# Patient Record
Sex: MALE | Race: BLACK OR AFRICAN AMERICAN | NOT HISPANIC OR LATINO | Employment: FULL TIME | URBAN - METROPOLITAN AREA
[De-identification: names, ages, dates, MRNs, and addresses within clinical notes are randomized per-mention and may not be internally consistent; named-entity substitution may affect disease eponyms.]

---

## 2023-08-23 ENCOUNTER — HOSPITAL ENCOUNTER (EMERGENCY)
Facility: HOSPITAL | Age: 38
Discharge: HOME | End: 2023-08-23
Attending: EMERGENCY MEDICINE
Payer: COMMERCIAL

## 2023-08-23 VITALS
HEART RATE: 90 BPM | RESPIRATION RATE: 18 BRPM | WEIGHT: 218.25 LBS | BODY MASS INDEX: 28.93 KG/M2 | HEIGHT: 73 IN | DIASTOLIC BLOOD PRESSURE: 101 MMHG | SYSTOLIC BLOOD PRESSURE: 140 MMHG | TEMPERATURE: 99 F | OXYGEN SATURATION: 99 %

## 2023-08-23 DIAGNOSIS — E86.0 DEHYDRATION: Primary | ICD-10-CM

## 2023-08-23 DIAGNOSIS — Z20.822 CLOSE EXPOSURE TO COVID-19 VIRUS: ICD-10-CM

## 2023-08-23 DIAGNOSIS — T67.9XXA HEAT EXPOSURE, INITIAL ENCOUNTER: ICD-10-CM

## 2023-08-23 DIAGNOSIS — R74.8 ELEVATED CPK: ICD-10-CM

## 2023-08-23 DIAGNOSIS — R25.2 MUSCLE CRAMP: ICD-10-CM

## 2023-08-23 LAB
ALBUMIN SERPL BCP-MCNC: 4.8 G/DL (ref 3.5–5.2)
ALP SERPL-CCNC: 56 U/L (ref 55–135)
ALT SERPL W/O P-5'-P-CCNC: 34 U/L (ref 10–44)
ANION GAP SERPL CALC-SCNC: 12 MMOL/L (ref 8–16)
AST SERPL-CCNC: 32 U/L (ref 10–40)
BASOPHILS # BLD AUTO: 0.03 K/UL (ref 0–0.2)
BASOPHILS NFR BLD: 0.6 % (ref 0–1.9)
BILIRUB SERPL-MCNC: 0.9 MG/DL (ref 0.1–1)
BUN SERPL-MCNC: 17 MG/DL (ref 6–20)
CALCIUM SERPL-MCNC: 9.7 MG/DL (ref 8.7–10.5)
CHLORIDE SERPL-SCNC: 102 MMOL/L (ref 95–110)
CK SERPL-CCNC: 1074 U/L (ref 20–200)
CO2 SERPL-SCNC: 25 MMOL/L (ref 23–29)
CREAT SERPL-MCNC: 1.4 MG/DL (ref 0.5–1.4)
DIFFERENTIAL METHOD: ABNORMAL
EOSINOPHIL # BLD AUTO: 0.4 K/UL (ref 0–0.5)
EOSINOPHIL NFR BLD: 8.5 % (ref 0–8)
ERYTHROCYTE [DISTWIDTH] IN BLOOD BY AUTOMATED COUNT: 12.5 % (ref 11.5–14.5)
EST. GFR  (NO RACE VARIABLE): >60 ML/MIN/1.73 M^2
GLUCOSE SERPL-MCNC: 77 MG/DL (ref 70–110)
HCT VFR BLD AUTO: 44.6 % (ref 40–54)
HGB BLD-MCNC: 15.5 G/DL (ref 14–18)
IMM GRANULOCYTES # BLD AUTO: 0.01 K/UL (ref 0–0.04)
IMM GRANULOCYTES NFR BLD AUTO: 0.2 % (ref 0–0.5)
INFLUENZA A, MOLECULAR: NEGATIVE
INFLUENZA B, MOLECULAR: NEGATIVE
LYMPHOCYTES # BLD AUTO: 1.7 K/UL (ref 1–4.8)
LYMPHOCYTES NFR BLD: 32.9 % (ref 18–48)
MCH RBC QN AUTO: 28.5 PG (ref 27–31)
MCHC RBC AUTO-ENTMCNC: 34.8 G/DL (ref 32–36)
MCV RBC AUTO: 82 FL (ref 82–98)
MONOCYTES # BLD AUTO: 0.5 K/UL (ref 0.3–1)
MONOCYTES NFR BLD: 9.1 % (ref 4–15)
NEUTROPHILS # BLD AUTO: 2.5 K/UL (ref 1.8–7.7)
NEUTROPHILS NFR BLD: 48.7 % (ref 38–73)
NRBC BLD-RTO: 0 /100 WBC
PLATELET # BLD AUTO: 273 K/UL (ref 150–450)
PMV BLD AUTO: 10.3 FL (ref 9.2–12.9)
POTASSIUM SERPL-SCNC: 4.1 MMOL/L (ref 3.5–5.1)
PROT SERPL-MCNC: 8.1 G/DL (ref 6–8.4)
RBC # BLD AUTO: 5.44 M/UL (ref 4.6–6.2)
SARS-COV-2 RDRP RESP QL NAA+PROBE: NEGATIVE
SODIUM SERPL-SCNC: 139 MMOL/L (ref 136–145)
SPECIMEN SOURCE: NORMAL
WBC # BLD AUTO: 5.07 K/UL (ref 3.9–12.7)

## 2023-08-23 PROCEDURE — 96360 HYDRATION IV INFUSION INIT: CPT

## 2023-08-23 PROCEDURE — 99284 EMERGENCY DEPT VISIT MOD MDM: CPT | Mod: 25

## 2023-08-23 PROCEDURE — 96361 HYDRATE IV INFUSION ADD-ON: CPT

## 2023-08-23 PROCEDURE — U0002 COVID-19 LAB TEST NON-CDC: HCPCS | Performed by: REGISTERED NURSE

## 2023-08-23 PROCEDURE — 87502 INFLUENZA DNA AMP PROBE: CPT | Performed by: REGISTERED NURSE

## 2023-08-23 PROCEDURE — 85025 COMPLETE CBC W/AUTO DIFF WBC: CPT | Performed by: REGISTERED NURSE

## 2023-08-23 PROCEDURE — 25000003 PHARM REV CODE 250: Performed by: REGISTERED NURSE

## 2023-08-23 PROCEDURE — 80053 COMPREHEN METABOLIC PANEL: CPT | Performed by: REGISTERED NURSE

## 2023-08-23 PROCEDURE — 82550 ASSAY OF CK (CPK): CPT | Performed by: REGISTERED NURSE

## 2023-08-23 RX ADMIN — SODIUM CHLORIDE 1000 ML: 9 INJECTION, SOLUTION INTRAVENOUS at 01:08

## 2023-08-23 RX ADMIN — SODIUM CHLORIDE 1000 ML: 9 INJECTION, SOLUTION INTRAVENOUS at 12:08

## 2023-08-23 NOTE — ED PROVIDER NOTES
Encounter Date: 8/23/2023       History     Chief Complaint   Patient presents with    COVID-19 Concerns     Exposed to COVID c/o body aches, non-productive cough, also c/o cramping after working in heat.      38-year-old male presents emergency department with complaints of muscle cramps and cough.  Patient reports positive exposure to COVID.  Patient reports extensive work in the heat.  Patient states that his arms lock up occasionally.  Patient denies any chest pain, shortness of breath, fever, chills or any other symptoms.    The history is provided by the patient.     Review of patient's allergies indicates:  No Known Allergies  Past Medical History:   Diagnosis Date    Hypertension     Murmur, cardiac      No past surgical history on file.  Family History   Problem Relation Age of Onset    Hypertension Mother     Hypertension Father      Social History     Tobacco Use    Smoking status: Former     Current packs/day: 0.00    Smokeless tobacco: Never   Substance Use Topics    Alcohol use: No    Drug use: No     Review of Systems   Constitutional:  Negative for fever.   HENT:  Negative for sore throat.    Respiratory:  Positive for cough. Negative for shortness of breath.    Cardiovascular:  Negative for chest pain.   Gastrointestinal:  Negative for nausea.   Genitourinary:  Negative for dysuria.   Musculoskeletal:  Positive for myalgias. Negative for back pain.        +muscle cramps   Skin:  Negative for rash.   Neurological:  Negative for weakness.   Hematological:  Does not bruise/bleed easily.   All other systems reviewed and are negative.      Physical Exam     Initial Vitals [08/23/23 1041]   BP Pulse Resp Temp SpO2   (!) 170/109 97 18 98.4 °F (36.9 °C) 99 %      MAP       --         Physical Exam    Constitutional: He appears well-developed and well-nourished. No distress.   HENT:   Head: Normocephalic and atraumatic.   Nose: Nose normal.   Mouth/Throat: Uvula is midline and oropharynx is clear  and moist.   Eyes: Conjunctivae and EOM are normal. Pupils are equal, round, and reactive to light.   Neck: Neck supple.   Normal range of motion.  Cardiovascular:  Normal rate and regular rhythm.           Pulmonary/Chest: Effort normal and breath sounds normal. No respiratory distress. He has no decreased breath sounds. He has no wheezes. He has no rales.   Abdominal: Abdomen is soft. Bowel sounds are normal. There is no abdominal tenderness.   Musculoskeletal:         General: Normal range of motion.      Cervical back: Normal range of motion and neck supple.     Neurological: He is alert and oriented to person, place, and time. He has normal strength. GCS eye subscore is 4. GCS verbal subscore is 5. GCS motor subscore is 6.   Skin: Skin is warm and dry. Capillary refill takes less than 2 seconds. No rash noted.   Psychiatric: He has a normal mood and affect. His speech is normal and behavior is normal.       ED Course   Procedures  Labs Reviewed   CBC W/ AUTO DIFFERENTIAL - Abnormal; Notable for the following components:       Result Value    Eosinophil % 8.5 (*)     All other components within normal limits   CK - Abnormal; Notable for the following components:    CPK 1074 (*)     All other components within normal limits   INFLUENZA A & B BY MOLECULAR   SARS-COV-2 RNA AMPLIFICATION, QUAL   COMPREHENSIVE METABOLIC PANEL          Imaging Results    None          Medications   sodium chloride 0.9% bolus 1,000 mL 1,000 mL (1,000 mLs Intravenous New Bag 8/23/23 1309)   sodium chloride 0.9% bolus 1,000 mL 1,000 mL (1,000 mLs Intravenous New Bag 8/23/23 1216)     Medical Decision Making  Amount and/or Complexity of Data Reviewed  Labs: ordered.                               Clinical Impression:   Final diagnoses:  [Z20.822] Close exposure to COVID-19 virus  [R74.8] Elevated CPK  [R25.2] Muscle cramp  [T67.9XXA] Heat exposure, initial encounter  [E86.0] Dehydration (Primary)        ED Disposition Condition     Discharge Stable          ED Prescriptions    None       Follow-up Information       Follow up With Specialties Details Why Contact Info    [facility] - Emergency Dept. Emergency Medicine  If symptoms worsen [id number] [address] [phone]             Link Montiel Jr., [facility]  08/23/23 1871

## 2023-08-23 NOTE — Clinical Note
"Jose Wilkes"Wolf was seen and treated in our emergency department on 8/23/2023.  He may return to work on 08/25/2023.       If you have any questions or concerns, please don't hesitate to call.      Link Montiel Jr., FNP"

## 2025-05-14 ENCOUNTER — HOSPITAL ENCOUNTER (INPATIENT)
Facility: HOSPITAL | Age: 40
LOS: 2 days | Discharge: HOME | DRG: 281 | End: 2025-05-16
Attending: EMERGENCY MEDICINE | Admitting: INTERNAL MEDICINE
Payer: COMMERCIAL

## 2025-05-14 DIAGNOSIS — R07.9 CHEST PAIN: ICD-10-CM

## 2025-05-14 DIAGNOSIS — F17.210 TOBACCO SMOKER, LESS THAN 10 CIGARETTES PER DAY: ICD-10-CM

## 2025-05-14 DIAGNOSIS — I21.3 STEMI (ST ELEVATION MYOCARDIAL INFARCTION): ICD-10-CM

## 2025-05-14 DIAGNOSIS — R94.31 ABNORMAL EKG: ICD-10-CM

## 2025-05-14 DIAGNOSIS — I16.1 HYPERTENSIVE EMERGENCY: ICD-10-CM

## 2025-05-14 DIAGNOSIS — I21.3 ST ELEVATION MYOCARDIAL INFARCTION (STEMI), UNSPECIFIED ARTERY: Primary | ICD-10-CM

## 2025-05-14 PROBLEM — J02.9 SORE THROAT: Status: ACTIVE | Noted: 2025-05-14

## 2025-05-14 LAB
ABORH RETYPE: NORMAL
ABSOLUTE EOSINOPHIL (OHS): 0.16 K/UL
ABSOLUTE MONOCYTE (OHS): 0.92 K/UL (ref 0.3–1)
ABSOLUTE NEUTROPHIL COUNT (OHS): 4.69 K/UL (ref 1.8–7.7)
ALBUMIN SERPL BCP-MCNC: 4.7 G/DL (ref 3.5–5.2)
ALLENS TEST: ABNORMAL
ALP SERPL-CCNC: 57 UNIT/L (ref 40–150)
ALT SERPL W/O P-5'-P-CCNC: 33 UNIT/L (ref 10–44)
ANION GAP (OHS): 12 MMOL/L (ref 8–16)
APTT PPP: 28.5 SECONDS (ref 21–32)
AST SERPL-CCNC: 21 UNIT/L (ref 11–45)
BASOPHILS # BLD AUTO: 0.03 K/UL
BASOPHILS NFR BLD AUTO: 0.4 %
BILIRUB SERPL-MCNC: 1.1 MG/DL (ref 0.1–1)
BNP SERPL-MCNC: <10 PG/ML (ref 0–99)
BUN SERPL-MCNC: 16 MG/DL (ref 6–20)
CALCIUM SERPL-MCNC: 9.6 MG/DL (ref 8.7–10.5)
CATH EF ESTIMATED: 60 %
CATH EF QUANTITATIVE: 65 %
CHLORIDE SERPL-SCNC: 102 MMOL/L (ref 95–110)
CO2 SERPL-SCNC: 20 MMOL/L (ref 23–29)
CREAT SERPL-MCNC: 1.3 MG/DL (ref 0.5–1.4)
DELSYS: ABNORMAL
ERYTHROCYTE [DISTWIDTH] IN BLOOD BY AUTOMATED COUNT: 13 % (ref 11.5–14.5)
GFR SERPLBLD CREATININE-BSD FMLA CKD-EPI: >60 ML/MIN/1.73/M2
GLUCOSE SERPL-MCNC: 92 MG/DL (ref 70–110)
GROUP A STREP MOLECULAR (OHS): NEGATIVE
HCO3 UR-SCNC: 25.2 MMOL/L (ref 24–28)
HCT VFR BLD AUTO: 43.4 % (ref 40–54)
HGB BLD-MCNC: 15 GM/DL (ref 14–18)
IMM GRANULOCYTES # BLD AUTO: 0.02 K/UL (ref 0–0.04)
IMM GRANULOCYTES NFR BLD AUTO: 0.3 % (ref 0–0.5)
INDIRECT COOMBS: NORMAL
INR PPP: 0.9 (ref 0.8–1.2)
LYMPHOCYTES # BLD AUTO: 1.41 K/UL (ref 1–4.8)
MCH RBC QN AUTO: 28 PG (ref 27–31)
MCHC RBC AUTO-ENTMCNC: 34.6 G/DL (ref 32–36)
MCV RBC AUTO: 81 FL (ref 82–98)
NUCLEATED RBC (/100WBC) (OHS): 0 /100 WBC
PCO2 BLDA: 39.6 MMHG (ref 35–45)
PH SMN: 7.41 [PH] (ref 7.35–7.45)
PLATELET # BLD AUTO: 258 K/UL (ref 150–450)
PMV BLD AUTO: 10.3 FL (ref 9.2–12.9)
PO2 BLDA: 26 MMHG (ref 40–60)
POC BE: 1 MMOL/L (ref -2–2)
POC SATURATED O2: 47 % (ref 95–100)
POTASSIUM SERPL-SCNC: 3.7 MMOL/L (ref 3.5–5.1)
PROT SERPL-MCNC: 8.3 GM/DL (ref 6–8.4)
PROTHROMBIN TIME: 10.8 SECONDS (ref 9–12.5)
RBC # BLD AUTO: 5.35 M/UL (ref 4.6–6.2)
RELATIVE EOSINOPHIL (OHS): 2.2 %
RELATIVE LYMPHOCYTE (OHS): 19.5 % (ref 18–48)
RELATIVE MONOCYTE (OHS): 12.7 % (ref 4–15)
RELATIVE NEUTROPHIL (OHS): 64.9 % (ref 38–73)
RH BLD: NORMAL
SAMPLE: ABNORMAL
SITE: ABNORMAL
SODIUM SERPL-SCNC: 134 MMOL/L (ref 136–145)
SPECIMEN OUTDATE: NORMAL
TROPONIN I SERPL DL<=0.01 NG/ML-MCNC: <0.006 NG/ML
WBC # BLD AUTO: 7.23 K/UL (ref 3.9–12.7)

## 2025-05-14 PROCEDURE — 85025 COMPLETE CBC W/AUTO DIFF WBC: CPT | Performed by: EMERGENCY MEDICINE

## 2025-05-14 PROCEDURE — 96374 THER/PROPH/DIAG INJ IV PUSH: CPT

## 2025-05-14 PROCEDURE — 20000000 HC ICU ROOM

## 2025-05-14 PROCEDURE — 25000003 PHARM REV CODE 250: Performed by: NURSE PRACTITIONER

## 2025-05-14 PROCEDURE — 63600175 PHARM REV CODE 636 W HCPCS: Performed by: EMERGENCY MEDICINE

## 2025-05-14 PROCEDURE — 63600175 PHARM REV CODE 636 W HCPCS: Performed by: INTERNAL MEDICINE

## 2025-05-14 PROCEDURE — C1769 GUIDE WIRE: HCPCS | Performed by: INTERNAL MEDICINE

## 2025-05-14 PROCEDURE — 84484 ASSAY OF TROPONIN QUANT: CPT | Performed by: EMERGENCY MEDICINE

## 2025-05-14 PROCEDURE — 99900035 HC TECH TIME PER 15 MIN (STAT)

## 2025-05-14 PROCEDURE — C1760 CLOSURE DEV, VASC: HCPCS | Performed by: INTERNAL MEDICINE

## 2025-05-14 PROCEDURE — 80053 COMPREHEN METABOLIC PANEL: CPT | Performed by: EMERGENCY MEDICINE

## 2025-05-14 PROCEDURE — C1894 INTRO/SHEATH, NON-LASER: HCPCS | Performed by: INTERNAL MEDICINE

## 2025-05-14 PROCEDURE — 99223 1ST HOSP IP/OBS HIGH 75: CPT | Mod: 25,,, | Performed by: INTERNAL MEDICINE

## 2025-05-14 PROCEDURE — 93458 L HRT ARTERY/VENTRICLE ANGIO: CPT | Performed by: INTERNAL MEDICINE

## 2025-05-14 PROCEDURE — 85730 THROMBOPLASTIN TIME PARTIAL: CPT | Performed by: EMERGENCY MEDICINE

## 2025-05-14 PROCEDURE — 86850 RBC ANTIBODY SCREEN: CPT | Performed by: EMERGENCY MEDICINE

## 2025-05-14 PROCEDURE — 83880 ASSAY OF NATRIURETIC PEPTIDE: CPT | Performed by: EMERGENCY MEDICINE

## 2025-05-14 PROCEDURE — 25500020 PHARM REV CODE 255: Performed by: INTERNAL MEDICINE

## 2025-05-14 PROCEDURE — B2111ZZ FLUOROSCOPY OF MULTIPLE CORONARY ARTERIES USING LOW OSMOLAR CONTRAST: ICD-10-PCS | Performed by: INTERNAL MEDICINE

## 2025-05-14 PROCEDURE — 87651 STREP A DNA AMP PROBE: CPT | Performed by: NURSE PRACTITIONER

## 2025-05-14 PROCEDURE — 27201423 OPTIME MED/SURG SUP & DEVICES STERILE SUPPLY: Performed by: INTERNAL MEDICINE

## 2025-05-14 PROCEDURE — 25000242 PHARM REV CODE 250 ALT 637 W/ HCPCS: Performed by: NURSE PRACTITIONER

## 2025-05-14 PROCEDURE — 4A023N7 MEASUREMENT OF CARDIAC SAMPLING AND PRESSURE, LEFT HEART, PERCUTANEOUS APPROACH: ICD-10-PCS | Performed by: INTERNAL MEDICINE

## 2025-05-14 PROCEDURE — 99285 EMERGENCY DEPT VISIT HI MDM: CPT | Mod: 25

## 2025-05-14 PROCEDURE — B2151ZZ FLUOROSCOPY OF LEFT HEART USING LOW OSMOLAR CONTRAST: ICD-10-PCS | Performed by: INTERNAL MEDICINE

## 2025-05-14 PROCEDURE — 80307 DRUG TEST PRSMV CHEM ANLYZR: CPT | Performed by: INTERNAL MEDICINE

## 2025-05-14 PROCEDURE — 85610 PROTHROMBIN TIME: CPT | Performed by: EMERGENCY MEDICINE

## 2025-05-14 PROCEDURE — 82803 BLOOD GASES ANY COMBINATION: CPT

## 2025-05-14 PROCEDURE — 93010 ELECTROCARDIOGRAM REPORT: CPT | Mod: ,,, | Performed by: INTERNAL MEDICINE

## 2025-05-14 PROCEDURE — 93458 L HRT ARTERY/VENTRICLE ANGIO: CPT | Mod: 26,,, | Performed by: INTERNAL MEDICINE

## 2025-05-14 PROCEDURE — 96375 TX/PRO/DX INJ NEW DRUG ADDON: CPT

## 2025-05-14 PROCEDURE — 25000003 PHARM REV CODE 250: Performed by: INTERNAL MEDICINE

## 2025-05-14 PROCEDURE — 99152 MOD SED SAME PHYS/QHP 5/>YRS: CPT | Mod: ,,, | Performed by: INTERNAL MEDICINE

## 2025-05-14 PROCEDURE — 25000003 PHARM REV CODE 250: Performed by: EMERGENCY MEDICINE

## 2025-05-14 PROCEDURE — 93005 ELECTROCARDIOGRAM TRACING: CPT

## 2025-05-14 DEVICE — KIT ANGIO SEAL 6FR VIP: Type: IMPLANTABLE DEVICE | Site: GROIN | Status: FUNCTIONAL

## 2025-05-14 RX ORDER — FENTANYL CITRATE 50 UG/ML
INJECTION, SOLUTION INTRAMUSCULAR; INTRAVENOUS
Status: DISCONTINUED | OUTPATIENT
Start: 2025-05-14 | End: 2025-05-14 | Stop reason: HOSPADM

## 2025-05-14 RX ORDER — SODIUM CHLORIDE 9 MG/ML
INJECTION, SOLUTION INTRAVENOUS CONTINUOUS
Status: ACTIVE | OUTPATIENT
Start: 2025-05-14 | End: 2025-05-15

## 2025-05-14 RX ORDER — DIPHENHYDRAMINE HYDROCHLORIDE 50 MG/ML
INJECTION, SOLUTION INTRAMUSCULAR; INTRAVENOUS
Status: DISCONTINUED | OUTPATIENT
Start: 2025-05-14 | End: 2025-05-14 | Stop reason: HOSPADM

## 2025-05-14 RX ORDER — LABETALOL HYDROCHLORIDE 5 MG/ML
INJECTION, SOLUTION INTRAVENOUS
Status: DISCONTINUED | OUTPATIENT
Start: 2025-05-14 | End: 2025-05-14 | Stop reason: HOSPADM

## 2025-05-14 RX ORDER — HYDRALAZINE HYDROCHLORIDE 20 MG/ML
20 INJECTION INTRAMUSCULAR; INTRAVENOUS
Status: COMPLETED | OUTPATIENT
Start: 2025-05-14 | End: 2025-05-14

## 2025-05-14 RX ORDER — CLOPIDOGREL BISULFATE 300 MG/1
600 TABLET, FILM COATED ORAL
Status: COMPLETED | OUTPATIENT
Start: 2025-05-14 | End: 2025-05-14

## 2025-05-14 RX ORDER — ASPIRIN 325 MG
325 TABLET ORAL
Status: DISCONTINUED | OUTPATIENT
Start: 2025-05-14 | End: 2025-05-14

## 2025-05-14 RX ORDER — NICARDIPINE HYDROCHLORIDE 0.2 MG/ML
0-15 INJECTION INTRAVENOUS CONTINUOUS
Status: DISCONTINUED | OUTPATIENT
Start: 2025-05-14 | End: 2025-05-15

## 2025-05-14 RX ORDER — CARVEDILOL 12.5 MG/1
12.5 TABLET ORAL 2 TIMES DAILY
Status: DISCONTINUED | OUTPATIENT
Start: 2025-05-14 | End: 2025-05-15

## 2025-05-14 RX ORDER — HYDRALAZINE HYDROCHLORIDE 20 MG/ML
INJECTION INTRAMUSCULAR; INTRAVENOUS
Status: DISCONTINUED | OUTPATIENT
Start: 2025-05-14 | End: 2025-05-14 | Stop reason: HOSPADM

## 2025-05-14 RX ORDER — PANTOPRAZOLE SODIUM 40 MG/1
40 TABLET, DELAYED RELEASE ORAL DAILY
Status: DISCONTINUED | OUTPATIENT
Start: 2025-05-15 | End: 2025-05-16 | Stop reason: HOSPADM

## 2025-05-14 RX ORDER — FLUTICASONE PROPIONATE 50 MCG
2 SPRAY, SUSPENSION (ML) NASAL DAILY
Status: DISCONTINUED | OUTPATIENT
Start: 2025-05-14 | End: 2025-05-16 | Stop reason: HOSPADM

## 2025-05-14 RX ORDER — ENOXAPARIN SODIUM 100 MG/ML
40 INJECTION SUBCUTANEOUS EVERY 24 HOURS
Status: DISCONTINUED | OUTPATIENT
Start: 2025-05-15 | End: 2025-05-16 | Stop reason: HOSPADM

## 2025-05-14 RX ORDER — HEPARIN SODIUM 5000 [USP'U]/ML
80 INJECTION, SOLUTION INTRAVENOUS; SUBCUTANEOUS
Status: DISCONTINUED | OUTPATIENT
Start: 2025-05-14 | End: 2025-05-14

## 2025-05-14 RX ORDER — ONDANSETRON 8 MG/1
8 TABLET, ORALLY DISINTEGRATING ORAL EVERY 8 HOURS PRN
Status: DISCONTINUED | OUTPATIENT
Start: 2025-05-14 | End: 2025-05-14

## 2025-05-14 RX ORDER — LIDOCAINE HYDROCHLORIDE 20 MG/ML
INJECTION, SOLUTION EPIDURAL; INFILTRATION; INTRACAUDAL; PERINEURAL
Status: DISCONTINUED | OUTPATIENT
Start: 2025-05-14 | End: 2025-05-14 | Stop reason: HOSPADM

## 2025-05-14 RX ORDER — HEPARIN SODIUM 5000 [USP'U]/ML
4000 INJECTION, SOLUTION INTRAVENOUS; SUBCUTANEOUS
Status: DISCONTINUED | OUTPATIENT
Start: 2025-05-14 | End: 2025-05-14

## 2025-05-14 RX ORDER — MIDAZOLAM HYDROCHLORIDE 1 MG/ML
INJECTION, SOLUTION INTRAMUSCULAR; INTRAVENOUS
Status: DISCONTINUED | OUTPATIENT
Start: 2025-05-14 | End: 2025-05-14 | Stop reason: HOSPADM

## 2025-05-14 RX ORDER — ONDANSETRON HYDROCHLORIDE 2 MG/ML
8 INJECTION, SOLUTION INTRAVENOUS EVERY 6 HOURS PRN
Status: DISCONTINUED | OUTPATIENT
Start: 2025-05-14 | End: 2025-05-16 | Stop reason: HOSPADM

## 2025-05-14 RX ORDER — SODIUM CHLORIDE 9 MG/ML
100 INJECTION, SOLUTION INTRAVENOUS CONTINUOUS
Status: DISCONTINUED | OUTPATIENT
Start: 2025-05-14 | End: 2025-05-14

## 2025-05-14 RX ORDER — ACETAMINOPHEN 325 MG/1
650 TABLET ORAL EVERY 4 HOURS PRN
Status: DISCONTINUED | OUTPATIENT
Start: 2025-05-14 | End: 2025-05-16 | Stop reason: HOSPADM

## 2025-05-14 RX ORDER — MUPIROCIN 20 MG/G
OINTMENT TOPICAL 2 TIMES DAILY
Status: DISCONTINUED | OUTPATIENT
Start: 2025-05-14 | End: 2025-05-16 | Stop reason: HOSPADM

## 2025-05-14 RX ORDER — HEPARIN SODIUM 5000 [USP'U]/ML
INJECTION, SOLUTION INTRAVENOUS; SUBCUTANEOUS CODE/TRAUMA/SEDATION MEDICATION
Status: DISCONTINUED | OUTPATIENT
Start: 2025-05-14 | End: 2025-05-16

## 2025-05-14 RX ADMIN — CARVEDILOL 12.5 MG: 12.5 TABLET, FILM COATED ORAL at 11:05

## 2025-05-14 RX ADMIN — CLOPIDOGREL BISULFATE 600 MG: 300 TABLET, FILM COATED ORAL at 08:05

## 2025-05-14 RX ADMIN — HEPARIN SODIUM 4000 UNITS: 5000 INJECTION, SOLUTION INTRAVENOUS; SUBCUTANEOUS at 08:05

## 2025-05-14 RX ADMIN — FLUTICASONE PROPIONATE 100 MCG: 50 SPRAY, METERED NASAL at 11:05

## 2025-05-14 RX ADMIN — MUPIROCIN: 20 OINTMENT TOPICAL at 11:05

## 2025-05-14 RX ADMIN — SODIUM CHLORIDE: 9 INJECTION, SOLUTION INTRAVENOUS at 10:05

## 2025-05-14 RX ADMIN — NICARDIPINE HYDROCHLORIDE 5 MG/HR: 0.2 INJECTION, SOLUTION INTRAVENOUS at 10:05

## 2025-05-14 RX ADMIN — HYDRALAZINE HYDROCHLORIDE 20 MG: 20 INJECTION INTRAMUSCULAR; INTRAVENOUS at 08:05

## 2025-05-14 NOTE — LETTER
May 16, 2025    Jose Bloom  [address] [address] [facility] [id number]  [place] [address]  Phone: [phone]  Fax: [phone]   May 16, 2025     Patient: Jose Bloom   YOB: 1985   Date of Visit: 5/14/2025       To Whom it May Concern:    Jose Bloom was admitted on 5/14/2025. He may return to work on May 23, 2025.    Please excuse him from any work missed.    If you have any questions or concerns, please don't hesitate to call.    Sincerely,         Dania Hoffman RN

## 2025-05-14 NOTE — Clinical Note
Diagnosis: STEMI (ST elevation myocardial infarction) [900078]   Future Attending Provider: VALORIE MCNEILL [[id number]]

## 2025-05-15 PROBLEM — I21.3 STEMI (ST ELEVATION MYOCARDIAL INFARCTION): Status: RESOLVED | Noted: 2025-05-14 | Resolved: 2025-05-15

## 2025-05-15 PROBLEM — R94.31 ABNORMAL EKG: Status: ACTIVE | Noted: 2025-05-15

## 2025-05-15 LAB
ABSOLUTE EOSINOPHIL (OHS): 0.08 K/UL
ABSOLUTE MONOCYTE (OHS): 0.69 K/UL (ref 0.3–1)
ABSOLUTE NEUTROPHIL COUNT (OHS): 6.47 K/UL (ref 1.8–7.7)
AMPHET UR QL SCN: NEGATIVE
ANION GAP (OHS): 12 MMOL/L (ref 8–16)
AORTIC ROOT ANNULUS: 3.4 CM
AORTIC SIZE INDEX: 1.3 CM/M2
ASCENDING AORTA: 3 CM
AV INDEX (PROSTH): 0.6
AV MEAN GRADIENT: 7 MMHG
AV PEAK GRADIENT: 13 MMHG
AV VALVE AREA BY VELOCITY RATIO: 2.7 CM²
AV VALVE AREA: 2.9 CM²
AV VELOCITY RATIO: 0.56
BARBITURATE SCN PRESENT UR: NEGATIVE
BASOPHILS # BLD AUTO: 0.02 K/UL
BASOPHILS NFR BLD AUTO: 0.3 %
BENZODIAZ UR QL SCN: ABNORMAL
BSA FOR ECHO PROCEDURE: 2.36 M2
BUN SERPL-MCNC: 13 MG/DL (ref 6–20)
CALCIUM SERPL-MCNC: 9.1 MG/DL (ref 8.7–10.5)
CANNABINOIDS UR QL SCN: ABNORMAL
CHLORIDE SERPL-SCNC: 106 MMOL/L (ref 95–110)
CHOLEST SERPL-MCNC: 174 MG/DL (ref 120–199)
CHOLEST/HDLC SERPL: 4.7 {RATIO} (ref 2–5)
CO2 SERPL-SCNC: 19 MMOL/L (ref 23–29)
COCAINE UR QL SCN: NEGATIVE
CREAT SERPL-MCNC: 0.9 MG/DL (ref 0.5–1.4)
CREAT UR-MCNC: 35 MG/DL (ref 23–375)
CV ECHO LV RWT: 0.67 CM
DOP CALC AO PEAK VEL: 1.8 M/S
DOP CALC AO VTI: 31.2 CM
DOP CALC LVOT AREA: 4.9 CM2
DOP CALC LVOT DIAMETER: 2.5 CM
DOP CALC LVOT PEAK VEL: 1 M/S
DOP CALC LVOT STROKE VOLUME: 91.7 CM3
DOP CALC RVOT PEAK VEL: 0.84 M/S
DOP CALC RVOT VTI: 15.2 CM
DOP CALCLVOT PEAK VEL VTI: 18.7 CM
E WAVE DECELERATION TIME: 335 MSEC
E/A RATIO: 1.08
E/E' RATIO: 7 M/S
ECHO LV POSTERIOR WALL: 1.5 CM (ref 0.6–1.1)
ERYTHROCYTE [DISTWIDTH] IN BLOOD BY AUTOMATED COUNT: 12.9 % (ref 11.5–14.5)
FRACTIONAL SHORTENING: 40 % (ref 28–44)
GFR SERPLBLD CREATININE-BSD FMLA CKD-EPI: >60 ML/MIN/1.73/M2
GLUCOSE SERPL-MCNC: 114 MG/DL (ref 70–110)
HCT VFR BLD AUTO: 40.9 % (ref 40–54)
HDLC SERPL-MCNC: 37 MG/DL (ref 40–75)
HDLC SERPL: 21.3 % (ref 20–50)
HGB BLD-MCNC: 14.4 GM/DL (ref 14–18)
HOLD SPECIMEN: NORMAL
IMM GRANULOCYTES # BLD AUTO: 0.03 K/UL (ref 0–0.04)
IMM GRANULOCYTES NFR BLD AUTO: 0.4 % (ref 0–0.5)
INTERVENTRICULAR SEPTUM: 1.3 CM (ref 0.6–1.1)
IVC DIAMETER: 1.1 CM
IVRT: 67 MSEC
LA MAJOR: 5.3 CM
LA MINOR: 5.7 CM
LA WIDTH: 3.7 CM
LDLC SERPL CALC-MCNC: 110.8 MG/DL (ref 63–159)
LEFT ATRIUM AREA SYSTOLIC (APICAL 2 CHAMBER): 17.29 CM2
LEFT ATRIUM AREA SYSTOLIC (APICAL 4 CHAMBER): 15.29 CM2
LEFT ATRIUM SIZE: 3.4 CM
LEFT ATRIUM VOLUME INDEX MOD: 17 ML/M2
LEFT ATRIUM VOLUME INDEX: 25 ML/M2
LEFT ATRIUM VOLUME MOD: 40 ML
LEFT ATRIUM VOLUME: 59 CM3
LEFT INTERNAL DIMENSION IN SYSTOLE: 2.7 CM (ref 2.1–4)
LEFT VENTRICLE DIASTOLIC VOLUME INDEX: 38.79 ML/M2
LEFT VENTRICLE DIASTOLIC VOLUME: 90 ML
LEFT VENTRICLE END SYSTOLIC VOLUME APICAL 2 CHAMBER: 45.23 ML
LEFT VENTRICLE END SYSTOLIC VOLUME APICAL 4 CHAMBER: 34.97 ML
LEFT VENTRICLE MASS INDEX: 107.1 G/M2
LEFT VENTRICLE SYSTOLIC VOLUME INDEX: 12.1 ML/M2
LEFT VENTRICLE SYSTOLIC VOLUME: 28 ML
LEFT VENTRICULAR INTERNAL DIMENSION IN DIASTOLE: 4.5 CM (ref 3.5–6)
LEFT VENTRICULAR MASS: 248.4 G
LV LATERAL E/E' RATIO: 6.5 M/S
LV SEPTAL E/E' RATIO: 6.5 M/S
LVED V (TEICH): 90.35 ML
LVES V (TEICH): 27.69 ML
LVOT MG: 2.48 MMHG
LVOT MV: 0.74 CM/S
LYMPHOCYTES # BLD AUTO: 0.68 K/UL (ref 1–4.8)
MAGNESIUM SERPL-MCNC: 1.9 MG/DL (ref 1.6–2.6)
MCH RBC QN AUTO: 28.6 PG (ref 27–31)
MCHC RBC AUTO-ENTMCNC: 35.2 G/DL (ref 32–36)
MCV RBC AUTO: 81 FL (ref 82–98)
METHADONE UR QL SCN: NEGATIVE
MV PEAK A VEL: 0.6 M/S
MV PEAK E VEL: 0.65 M/S
MV STENOSIS PRESSURE HALF TIME: 97.23 MS
MV VALVE AREA P 1/2 METHOD: 2.26 CM2
NONHDLC SERPL-MCNC: 137 MG/DL
NUCLEATED RBC (/100WBC) (OHS): 0 /100 WBC
OPIATES UR QL SCN: NEGATIVE
PCP UR QL: NEGATIVE
PHOSPHATE SERPL-MCNC: 3.2 MG/DL (ref 2.7–4.5)
PLATELET # BLD AUTO: 232 K/UL (ref 150–450)
PMV BLD AUTO: 10.2 FL (ref 9.2–12.9)
POTASSIUM SERPL-SCNC: 3.5 MMOL/L (ref 3.5–5.1)
PV MEAN GRADIENT: 2 MMHG
RA MAJOR: 4.43 CM
RA PRESSURE ESTIMATED: 3 MMHG
RA WIDTH: 2.7 CM
RBC # BLD AUTO: 5.04 M/UL (ref 4.6–6.2)
RELATIVE EOSINOPHIL (OHS): 1 %
RELATIVE LYMPHOCYTE (OHS): 8.5 % (ref 18–48)
RELATIVE MONOCYTE (OHS): 8.7 % (ref 4–15)
RELATIVE NEUTROPHIL (OHS): 81.1 % (ref 38–73)
SODIUM SERPL-SCNC: 137 MMOL/L (ref 136–145)
STJ: 3.1 CM
TDI LATERAL: 0.1 M/S
TDI SEPTAL: 0.1 M/S
TDI: 0.1 M/S
TRICUSPID ANNULAR PLANE SYSTOLIC EXCURSION: 2.3 CM
TRIGL SERPL-MCNC: 131 MG/DL (ref 30–150)
TROPONIN I SERPL DL<=0.01 NG/ML-MCNC: 0.02 NG/ML
WBC # BLD AUTO: 7.97 K/UL (ref 3.9–12.7)
Z-SCORE OF LEFT VENTRICULAR DIMENSION IN END DIASTOLE: -7.21
Z-SCORE OF LEFT VENTRICULAR DIMENSION IN END SYSTOLE: -5.74

## 2025-05-15 PROCEDURE — 63600175 PHARM REV CODE 636 W HCPCS: Performed by: INTERNAL MEDICINE

## 2025-05-15 PROCEDURE — 25000003 PHARM REV CODE 250: Performed by: NURSE PRACTITIONER

## 2025-05-15 PROCEDURE — 36415 COLL VENOUS BLD VENIPUNCTURE: CPT | Performed by: NURSE PRACTITIONER

## 2025-05-15 PROCEDURE — 21400001 HC TELEMETRY ROOM

## 2025-05-15 PROCEDURE — 99233 SBSQ HOSP IP/OBS HIGH 50: CPT | Mod: 25,,, | Performed by: INTERNAL MEDICINE

## 2025-05-15 PROCEDURE — 80061 LIPID PANEL: CPT | Performed by: NURSE PRACTITIONER

## 2025-05-15 PROCEDURE — 63600175 PHARM REV CODE 636 W HCPCS: Performed by: NURSE PRACTITIONER

## 2025-05-15 PROCEDURE — 80048 BASIC METABOLIC PNL TOTAL CA: CPT | Performed by: NURSE PRACTITIONER

## 2025-05-15 PROCEDURE — 83735 ASSAY OF MAGNESIUM: CPT | Performed by: NURSE PRACTITIONER

## 2025-05-15 PROCEDURE — 84100 ASSAY OF PHOSPHORUS: CPT | Performed by: NURSE PRACTITIONER

## 2025-05-15 PROCEDURE — 85025 COMPLETE CBC W/AUTO DIFF WBC: CPT | Performed by: NURSE PRACTITIONER

## 2025-05-15 PROCEDURE — 25000003 PHARM REV CODE 250: Performed by: INTERNAL MEDICINE

## 2025-05-15 PROCEDURE — 84484 ASSAY OF TROPONIN QUANT: CPT | Performed by: NURSE PRACTITIONER

## 2025-05-15 RX ORDER — ASPIRIN 81 MG/1
81 TABLET ORAL DAILY
Status: DISCONTINUED | OUTPATIENT
Start: 2025-05-15 | End: 2025-05-16 | Stop reason: HOSPADM

## 2025-05-15 RX ORDER — FUROSEMIDE 10 MG/ML
40 INJECTION INTRAMUSCULAR; INTRAVENOUS ONCE
Status: COMPLETED | OUTPATIENT
Start: 2025-05-15 | End: 2025-05-15

## 2025-05-15 RX ORDER — CARVEDILOL 12.5 MG/1
25 TABLET ORAL 2 TIMES DAILY
Status: DISCONTINUED | OUTPATIENT
Start: 2025-05-15 | End: 2025-05-16 | Stop reason: HOSPADM

## 2025-05-15 RX ORDER — TALC
6 POWDER (GRAM) TOPICAL NIGHTLY PRN
Status: DISCONTINUED | OUTPATIENT
Start: 2025-05-15 | End: 2025-05-16 | Stop reason: HOSPADM

## 2025-05-15 RX ORDER — ATORVASTATIN CALCIUM 10 MG/1
20 TABLET, FILM COATED ORAL NIGHTLY
Status: DISCONTINUED | OUTPATIENT
Start: 2025-05-15 | End: 2025-05-16 | Stop reason: HOSPADM

## 2025-05-15 RX ORDER — LOSARTAN POTASSIUM 50 MG/1
50 TABLET ORAL DAILY
Status: DISCONTINUED | OUTPATIENT
Start: 2025-05-15 | End: 2025-05-16

## 2025-05-15 RX ADMIN — ACETAMINOPHEN 650 MG: 325 TABLET ORAL at 05:05

## 2025-05-15 RX ADMIN — CARVEDILOL 12.5 MG: 12.5 TABLET, FILM COATED ORAL at 08:05

## 2025-05-15 RX ADMIN — CARVEDILOL 25 MG: 12.5 TABLET, FILM COATED ORAL at 10:05

## 2025-05-15 RX ADMIN — ENOXAPARIN SODIUM 40 MG: 40 INJECTION SUBCUTANEOUS at 05:05

## 2025-05-15 RX ADMIN — MUPIROCIN: 20 OINTMENT TOPICAL at 10:05

## 2025-05-15 RX ADMIN — CARVEDILOL 25 MG: 12.5 TABLET, FILM COATED ORAL at 09:05

## 2025-05-15 RX ADMIN — FUROSEMIDE 40 MG: 10 INJECTION, SOLUTION INTRAMUSCULAR; INTRAVENOUS at 09:05

## 2025-05-15 RX ADMIN — FLUTICASONE PROPIONATE 100 MCG: 50 SPRAY, METERED NASAL at 08:05

## 2025-05-15 RX ADMIN — NICARDIPINE HYDROCHLORIDE 2.5 MG/HR: 0.2 INJECTION, SOLUTION INTRAVENOUS at 08:05

## 2025-05-15 RX ADMIN — NICARDIPINE HYDROCHLORIDE 10 MG/HR: 0.2 INJECTION, SOLUTION INTRAVENOUS at 01:05

## 2025-05-15 RX ADMIN — ATORVASTATIN CALCIUM 20 MG: 10 TABLET, FILM COATED ORAL at 09:05

## 2025-05-15 RX ADMIN — MUPIROCIN: 20 OINTMENT TOPICAL at 08:05

## 2025-05-15 RX ADMIN — ONDANSETRON 8 MG: 2 INJECTION INTRAMUSCULAR; INTRAVENOUS at 12:05

## 2025-05-15 RX ADMIN — PANTOPRAZOLE SODIUM 40 MG: 40 TABLET, DELAYED RELEASE ORAL at 08:05

## 2025-05-15 RX ADMIN — ACETAMINOPHEN 650 MG: 325 TABLET ORAL at 08:05

## 2025-05-15 RX ADMIN — Medication 6 MG: at 10:05

## 2025-05-15 RX ADMIN — ATORVASTATIN CALCIUM 20 MG: 10 TABLET, FILM COATED ORAL at 10:05

## 2025-05-15 RX ADMIN — LOSARTAN POTASSIUM 50 MG: 50 TABLET, FILM COATED ORAL at 09:05

## 2025-05-15 RX ADMIN — ASPIRIN 81 MG: 81 TABLET, COATED ORAL at 09:05

## 2025-05-15 NOTE — EICU
Intervention Initiated From:  [facility] / EICU    Buddy intervened regarding:  Rounding (Video assessment)    Comments: Virtual ICU Quality Rounds    Admit Date: 5/14/2025  Hospital Day: 1    ICU Day: 13h    24H Vital Sign Range:  Temp:  [98.1 °F (36.7 °C)-98.5 °F (36.9 °C)]   Pulse:  []   Resp:  [13-37]   BP: (121-179)/()   SpO2:  [92 %-99 %]     VICU Surveillance Screening    LDA reconciliation : Yes

## 2025-05-15 NOTE — HOSPITAL COURSE
5/15/2025-Patient seen and examined today, s/p [facility] which showed non-obs CAD. Feels ok. Reported some mild CP/SOB overnight, improved. Off Cardene drip. Meds adjusted. Labs reviewed.

## 2025-05-15 NOTE — SUBJECTIVE & OBJECTIVE
Past Medical History:   Diagnosis Date    Hypertension     Murmur, cardiac        No past surgical history on file.    Review of patient's allergies indicates:  No Known Allergies    Family History       Problem Relation (Age of Onset)    Hypertension Mother, Father          Tobacco Use    Smoking status: Some Days     Current packs/day: 0.10     Types: Cigarettes    Smokeless tobacco: Never   Substance and Sexual Activity    Alcohol use: Yes     Comment: depends on the week, at most a couple drinks after work; this past week that has been daily    Drug use: No    Sexual activity: Not on file         Review of Systems   Constitutional:  Positive for diaphoresis (resolved).   HENT:  Positive for congestion and sore throat.    Respiratory:  Positive for cough.    Cardiovascular:  Positive for chest pain (resolved).   Gastrointestinal:  Negative for abdominal pain, nausea and vomiting.     Objective:     Vital Signs (Most Recent):  Temp: 98.2 °F (36.8 °C) (05/14/25 2040)  Pulse: 81 (05/14/25 2059)  Resp: (!) 21 (05/14/25 2059)  BP: (!) 160/118 (05/14/25 2059)  SpO2: 95 % (05/14/25 2059) Vital Signs (24h Range):  Temp:  [98.2 °F (36.8 °C)] 98.2 °F (36.8 °C)  Pulse:  [71-81] 81  Resp:  [16-21] 21  SpO2:  [95 %-99 %] 95 %  BP: (160-179)/(118) 160/118        Body mass index is 28.8 kg/m².    No intake or output data in the 24 hours ending 05/14/25 2157     Physical Exam  Vitals and nursing note reviewed.   Constitutional:       General: He is awake. He is not in acute distress.  HENT:      Head: Atraumatic.      Nose: Mucosal edema and congestion present.   Eyes:      Conjunctiva/sclera:      Right eye: Right conjunctiva is injected.      Left eye: Left conjunctiva is injected.   Neck:      Vascular: No JVD.   Cardiovascular:      Rate and Rhythm: Normal rate and regular rhythm.      Pulses:           Radial pulses are 2+ on the right side and 2+ on the left side.        Dorsalis pedis pulses are 2+ on the right  side and 2+ on the left side.   Pulmonary:      Effort: Pulmonary effort is normal.      Breath sounds: Normal breath sounds.   Abdominal:      General: Bowel sounds are normal. There is no distension.      Palpations: Abdomen is soft.      Tenderness: There is no abdominal tenderness.   Musculoskeletal:      Right lower leg: No edema.      Left lower leg: No edema.   Skin:     General: Skin is warm and dry.      Capillary Refill: Capillary refill takes less than 2 seconds.          Neurological:      Mental Status: He is alert. Mental status is at baseline.      GCS: GCS eye subscore is 4. GCS verbal subscore is 5. GCS motor subscore is 6.      Cranial Nerves: Cranial nerves 2-12 are intact.   Psychiatric:         Attention and Perception: Attention normal.         Mood and Affect: Mood is anxious. Affect is tearful.         Speech: Speech normal.         Behavior: Behavior normal. Behavior is cooperative.         Thought Content: Thought content normal.        Vents:       Lines/Drains/Airways       Peripheral Intravenous Line  Duration                  Peripheral IV - Single Lumen 05/14/25 2045 18 G Right Antecubital <1 day         Peripheral IV - Single Lumen 05/14/25 2055 20 G 1 1/4 in Left Antecubital <1 day                    Significant Labs:    CBC/Anemia Profile:  Recent Labs   Lab 05/14/25 2049   WBC 7.23   HGB 15.0   HCT 43.4      MCV 81*   RDW 13.0        Chemistries:  Recent Labs   Lab 05/14/25 2049   *   K 3.7      CO2 20*   BUN 16   CREATININE 1.3   CALCIUM 9.6   ALBUMIN 4.7   PROT 8.3   BILITOT 1.1*   ALKPHOS 57   ALT 33   AST 21       Troponin:   Recent Labs   Lab 05/14/25 2049   TROPONINI <0.006     All pertinent labs within the past 24 hours have been reviewed.    Significant Imaging:   I have reviewed all pertinent imaging results/findings within the past 24 hours.

## 2025-05-15 NOTE — ASSESSMENT & PLAN NOTE
Reports one to two cigarettes per day  We discussed specific cardiac risks of nicotine   He verbalizes understanding and goal of immediate cessation

## 2025-05-15 NOTE — EICU
EICU BRIEF ADMIT NOTE:    HISTORY:  STEMI Please refer to H/P and ER notes for detail    CAMERA ASSESSMENT: Two way audiovisual assessment was done: Yes    Telemetry was reviewed. Medical records including notes, labs and imaging were reviewed.Yes    DISCUSSED with bedside nurse.No     ASSESSMENT AND PLAN:    # STEMI: s/p cardiac cath, report pending). Antiplatelet therapy, beta blocker    BEST PRACTICES REVIEW:    INTUBATED: No  GLYCEMIN CONTROL:  Diabetes:  No   STRESS ULCER PROPHYLAXIS:  PPI   DVT PROPHYLAXIS:  Pharmacological    Thank You for allowing EICU to participate in the care of the patient. Please call as needed      Ludin Pratt MD  EICU  Critical Care Medicine

## 2025-05-15 NOTE — PROGRESS NOTES
Pt arrived to ICU via bed from [facility] at this time. NSR on heart monitor. Pt aaox4 in NAD. On RA. Cardene and NS gtt infusing on arrival; see MAR. R groin site soft, dressing CDI. Bed rest ends at 0100. Pt denies CP at this time, but reported intermittent CP prior to arrival.   Full HTT assessment as charted.   Discussed POC w/pt and spouse at bs. Pt has been orientated to bed rest, room, call light, fall precautions, and board. No further questions or concerns at this time.

## 2025-05-15 NOTE — ASSESSMENT & PLAN NOTE
-Initial EKG concerning for STEMI  -s/p LHC which showed non-obs CAD  -Off Cardende gtt  -Continue ASA, statin, BB, ARB  -Transfer to telemetry

## 2025-05-15 NOTE — ASSESSMENT & PLAN NOTE
Patient has a current diagnosis of Hypertensive emergency with end organ damage evidenced by acute coronary syndrome which is uncontrolled.    He has been taking over the counter cold/congestion med (dayquil) which likely contributed to BP elevation. We discussed risks along with safe cough/congestion medication for future.    Latest blood pressure and vitals reviewed-   Temp:  [98.1 °F (36.7 °C)-98.5 °F (36.9 °C)]   Pulse:  []   Resp:  [13-37]   BP: (121-179)/()   SpO2:  [92 %-99 %] .     Home meds for hypertension were reviewed currently taking losartan daily     Patient currently on IV antihypertensives - Cardene infusion, titrate for goal SBP < 140  Medication adjustment for hospital antihypertensives is as follows- Discussed with Cardiology will continue cardene drip, initiate coreg, Plan to adjust further in am pending response to coreg.    Will aim for controlled BP reduction by medications noted above. Monitor and mitigate end organ damage as indicated.    5/15 - cont meds , bp currently controlled

## 2025-05-15 NOTE — ASSESSMENT & PLAN NOTE
Non obstructive CAD on emergent LHC with EF 55%  Believe STEMI is direct result of malignant hypertension; see plan for management above  Continuous ICU cardiac monitoring

## 2025-05-15 NOTE — HPI
40 year old male with known HTN; tobacco and marijuana smoker    Presented to  ~8am 5/14 with chest pain, cough, sore throat, and congestion. /125. EKG showed ST elevation, he was given an ASA 325mg and recommended EMS to ED immediately. Pt declined ambulance and went home.    He presented to ED @ 2043 on 5/14 with 10/10 chest pain and diaphoresis  EKG again with ST elevation MI. /118  Taken emergently for C

## 2025-05-15 NOTE — ASSESSMENT & PLAN NOTE
With nasal congestion  Denies history of environmental allergies, does work outdoors    Suspect viral etiology; will rule out strep throat  Trial flonase for nasal congestion; adjust if not helpful  Avoid decongestants that effect BP

## 2025-05-15 NOTE — PROGRESS NOTES
[facility] - Intensive Care ([facility])  Critical Care Medicine  Progress Note    Patient Name: Jose Bloom  MRN: [id number]  Admission Date: 5/14/2025  Hospital Length of Stay: 1 days  Code Status: Full Code  Attending Provider: Christ Butts MD  Primary Care Provider: Rachele Peter   Principal Problem: STEMI (ST elevation myocardial infarction)    Subjective:     HPI:  40 year old male with known HTN; tobacco and marijuana smoker    Presented to  ~8am 5/14 with chest pain, cough, sore throat, and congestion. /125. EKG showed ST elevation, he was given an ASA 325mg and recommended EMS to ED immediately. Pt declined ambulance and went home.    He presented to ED @ 2043 on 5/14 with 10/10 chest pain and diaphoresis  EKG again with ST elevation MI. /118  Taken emergently for [facility]    Hospital/ICU Course:  LHC revealed non obstructive CAD with no acute lesion; EF 55%  Per Cardiology, ST elevation likely s/t Hypertensive emergency  Admitting to ICU from cath lab on cardene infusion for BP control  Critical Care consulted to assist with medical management. Seen on arrival.    5/15 no acute events   No complains   No chest pain    Interval History: no acute events  No complaints       Objective:     Vital Signs (Most Recent):  Temp: 98.5 °F (36.9 °C) (05/15/25 0715)  Pulse: 80 (05/15/25 1100)  Resp: 20 (05/15/25 1100)  BP: 125/80 (05/15/25 1100)  SpO2: 97 % (05/15/25 1100) Vital Signs (24h Range):  Temp:  [98.1 °F (36.7 °C)-98.5 °F (36.9 °C)] 98.5 °F (36.9 °C)  Pulse:  [] 80  Resp:  [13-37] 20  SpO2:  [92 %-99 %] 97 %  BP: (121-179)/() 125/80     Weight: 108 kg (238 lb)  Body mass index is 31.4 kg/m².      Intake/Output Summary (Last 24 hours) at 5/15/2025 1254  Last data filed at 5/15/2025 1054  Gross per 24 hour   Intake 1555.23 ml   Output 3150 ml   Net -1594.77 ml        Physical Exam  Vitals and nursing note reviewed.   Constitutional:       General: He is not in acute  distress.  HENT:      Head: Normocephalic and atraumatic.   Eyes:      General:         Left eye: No discharge.   Cardiovascular:      Rate and Rhythm: Normal rate and regular rhythm.      Heart sounds: No murmur heard.  Pulmonary:      Effort: No respiratory distress.      Breath sounds: No wheezing.   Abdominal:      General: There is no distension.      Palpations: Abdomen is soft.   Musculoskeletal:         General: No swelling or tenderness.      Cervical back: Neck supple. No rigidity.   Neurological:      General: No focal deficit present.      Mental Status: He is alert and oriented to person, place, and time.           Review of Systems   HENT: Negative.     Respiratory: Negative.     Cardiovascular: Negative.    Gastrointestinal: Negative.    Genitourinary: Negative.    Musculoskeletal: Negative.        Vents:       Lines/Drains/Airways       Peripheral Intravenous Line  Duration                  Peripheral IV - Single Lumen 05/14/25 2045 18 G Right Antecubital <1 day         Peripheral IV - Single Lumen 05/14/25 2055 20 G 1 1/4 in Left Antecubital <1 day                    Significant Labs:    CBC/Anemia Profile:  Recent Labs   Lab 05/14/25  2049 05/15/25  0326   WBC 7.23 7.97   HGB 15.0 14.4   HCT 43.4 40.9    232   MCV 81* 81*   RDW 13.0 12.9        Chemistries:  Recent Labs   Lab 05/14/25  2049 05/15/25  0326   * 137   K 3.7 3.5    106   CO2 20* 19*   BUN 16 13   CREATININE 1.3 0.9   CALCIUM 9.6 9.1   ALBUMIN 4.7  --    PROT 8.3  --    BILITOT 1.1*  --    ALKPHOS 57  --    ALT 33  --    AST 21  --    MG  --  1.9   PHOS  --  3.2       All pertinent labs within the past 24 hours have been reviewed.    Significant Imaging:  I have reviewed all pertinent imaging results/findings within the past 24 hours.    ABG  Recent Labs   Lab 05/14/25 2059   PH 7.411   PO2 26*   PCO2 39.6   HCO3 25.2   BE 1     Assessment/Plan:     ENT  Sore throat  With nasal congestion  Denies history of  environmental allergies, does work outdoors    Suspect viral etiology; will rule out strep throat  Trial flonase for nasal congestion; adjust if not helpful  Avoid decongestants that effect BP    Cardiac/Vascular  * STEMI (ST elevation myocardial infarction)  Non obstructive CAD on emergent C with EF 55%  Believe STEMI is direct result of malignant hypertension; see plan for management above  Continuous ICU cardiac monitoring    5/15 nonobstructive asa/ statin betablocker    Hypertensive emergency  Patient has a current diagnosis of Hypertensive emergency with end organ damage evidenced by acute coronary syndrome which is uncontrolled.    He has been taking over the counter cold/congestion med (dayquil) which likely contributed to BP elevation. We discussed risks along with safe cough/congestion medication for future.    Latest blood pressure and vitals reviewed-   Temp:  [98.1 °F (36.7 °C)-98.5 °F (36.9 °C)]   Pulse:  []   Resp:  [13-37]   BP: (121-179)/()   SpO2:  [92 %-99 %] .     Home meds for hypertension were reviewed currently taking losartan daily     Patient currently on IV antihypertensives - Cardene infusion, titrate for goal SBP < 140  Medication adjustment for hospital antihypertensives is as follows- Discussed with Cardiology will continue cardene drip, initiate coreg, Plan to adjust further in am pending response to coreg.    Will aim for controlled BP reduction by medications noted above. Monitor and mitigate end organ damage as indicated.    5/15 - cont meds , bp currently controlled     Other  Tobacco smoker, less than 10 cigarettes per day  Reports one to two cigarettes per day  We discussed specific cardiac risks of nicotine   He verbalizes understanding and goal of immediate cessation    Will sign off on transfer        Mahsa Curran MD  Critical Care Medicine  [facility] - Int[facility] ([facility])

## 2025-05-15 NOTE — BRIEF OP NOTE
[facility] - Cath Lab (Hospital)  Brief Operative Note  Cardiology    SUMMARY     Surgery Date: 5/14/2025     Surgeons and Role:     * Christ Butts MD - Primary    Assisting Surgeon: None    Pre-op Diagnosis:  STEMI (ST elevation myocardial infarction) [I21.3]    Post-op Diagnosis: Post-Op Diagnosis Codes:     * ST elevation myocardial infarction (STEMI), unspecified artery [I21.3]    Procedure Performed:     Procedure(s) (LRB):  Left heart cath (Left)  Ventriculogram, Left  ANGIOGRAM, CORONARY ARTERY (N/A)    Technical Procedures Used:     Operative Findings:   Results for orders placed during the hospital encounter of 05/14/25    Cardiac catheterization (Needs Review)  This result has not been signed. Information might be incomplete.    Conclusion    The ejection fraction was calculated to be 65%.    The ejection fraction was greater than 55% by visual estimate.    The pre-procedure left ventricular end diastolic pressure was 15.    The post-procedure left ventricular end diastolic pressure was 19.    The estimated blood loss was none.    There was non-obstructive coronary artery disease..    There was diastolic dysfunction.    The filling pressures on the left were mildly elevated.    Agressive HTN control    The procedure log was documented by Documenter: Josie Polanco RN and verified by Christ Butts MD.    Date: 5/14/2025  Time: 9:34 PM    Treat HTN   S/p angioseal RFA     Estimated Blood Loss: * No values recorded between 5/14/2025 12:00 AM and 5/14/2025  9:39 PM *         Specimens:   Specimen (24h ago, onward)      None

## 2025-05-15 NOTE — HOSPITAL COURSE
LHC revealed non obstructive CAD with no acute lesion; EF 55%  Per Cardiology, ST elevation likely s/t Hypertensive emergency  Admitting to ICU from cath lab on cardene infusion for BP control  Critical Care consulted to assist with medical management. Seen on arrival.    5/15 no acute events   No complains   No chest pain

## 2025-05-15 NOTE — PROGRESS NOTES
R groin site soft, no hematoma noted. Dressing CDI. Bed rest completed at this time. HOB raised and pt positioned comfortably w/o issue.

## 2025-05-15 NOTE — PLAN OF CARE
[facility] ([facility])  Initial Discharge Assessment       Primary Care Provider: Rachele Peter    Admission Diagnosis: STEMI (ST elevation myocardial infarction) [I21.3]  Chest pain [R07.9]  ST elevation myocardial infarction (STEMI), unspecified artery [I21.3]    Admission Date: 5/14/2025  Expected Discharge Date:     Transition of Care Barriers: (P) None    Payor: BLUE CROSS BLUE SHIELD / Plan: [place] / Product Type: HMO /     Extended Emergency Contact Information  Primary Emergency Contact: Tiffanie Bloom  Mobile Phone: [phone]  Relation: Spouse  Preferred language: English   needed? No    Discharge Plan A: (P) Home with family         [id number] - [address]  [address] [facility] [address]  Phone: [phone] Fax: [phone]      Initial Assessment (most recent)       Adult Discharge Assessment - 05/15/25 1237          Discharge Assessment    Assessment Type Discharge Planning Assessment (P)      Confirmed/corrected address, phone number and insurance Yes (P)      Confirmed Demographics Correct on Facesheet (P)      Source of Information patient;family (P)      When was your last doctors appointment? 10/07/24 (P)      Communicated LIZA with patient/caregiver Date not available/Unable to determine (P)      Reason For Admission NSTEMI (P)      People in Home spouse;child(conor), dependent (P)      Facility Arrived From: home (P)      Do you expect to return to your current living situation? Yes (P)      Do you have help at home or someone to help you manage your care at home? Yes (P)      Who are your caregiver(s) and their phone number(s)? spouse (P)      Prior to hospitilization cognitive status: Alert/Oriented (P)      Current cognitive status: Alert/Oriented (P)      Walking or Climbing Stairs Difficulty no (P)      Dressing/Bathing Difficulty no (P)      Equipment Currently Used at Home none (P)      Readmission within 30  days? No (P)      Patient currently being followed by outpatient case management? No (P)      Do you currently have service(s) that help you manage your care at home? No (P)      Do you take prescription medications? Yes (P)      Do you have prescription coverage? Yes (P)      Do you have any problems affording any of your prescribed medications? No (P)      Who is going to help you get home at discharge? spouse (P)      How do you get to doctors appointments? car, drives self;family or friend will provide (P)      Are you on dialysis? No (P)      Do you take coumadin? No (P)      Discharge Plan A Home with family (P)      DME Needed Upon Discharge  none (P)      Discharge Plan discussed with: Patient;Spouse/sig other (P)      Transition of Care Barriers None (P)                       Patient lives with spouse and 16 year old son.  Patient is independent with ADL's.  Currently no needs.

## 2025-05-15 NOTE — INTERVAL H&P NOTE
The patient has been examined and the H&P has been reviewed:    I concur with the findings and no changes have occurred since H&P was written.    Anesthesia/Surgery risks, benefits and alternative options discussed and understood by patient/family.          Active Hospital Problems    Diagnosis  POA    *STEMI (ST elevation myocardial infarction) [I21.3]  Yes      Resolved Hospital Problems   No resolved problems to display.

## 2025-05-15 NOTE — CONSULTS
[facility])  Critical Care Medicine  Consult Note    Patient Name: Jose Bloom  MRN: [id number]  Admission Date: 5/14/2025  Hospital Length of Stay: 0 days  Code Status: Full Code  Attending Physician: Christ Butts MD   Primary Care Provider: [place] Provider   Principal Problem: STEMI (ST elevation myocardial infarction)    Inpatient consult to Critical Care Medicine  Consult performed by: Tiffanie Lyon ACN-BC  Consult ordered by: Christ Butts MD        Subjective:     HPI:  40 year old male with known HTN; tobacco and marijuana smoker    Presented to  ~8am 5/14 with chest pain, cough, sore throat, and congestion. /125. EKG showed ST elevation, he was given an ASA 325mg and recommended EMS to ED immediately. Pt declined ambulance and went home.    He presented to ED @ 2043 on 5/14 with 10/10 chest pain and diaphoresis  EKG again with ST elevation MI. /118  Taken emergently for [facility]    Hospital/ICU Course:  LHC revealed non obstructive CAD with no acute lesion; EF 55%  Per Cardiology, ST elevation likely s/t Hypertensive emergency  Admitting to ICU from cath lab on cardene infusion for BP control  Critical Care consulted to assist with medical management. Seen on arrival.    Past Medical History:   Diagnosis Date    Hypertension     Murmur, cardiac        No past surgical history on file.    Review of patient's allergies indicates:  No Known Allergies    Family History       Problem Relation (Age of Onset)    Hypertension Mother, Father          Tobacco Use    Smoking status: Some Days     Current packs/day: 0.10     Types: Cigarettes    Smokeless tobacco: Never   Substance and Sexual Activity    Alcohol use: Yes     Comment: depends on the week, at most a couple drinks after work; this past week that has been daily    Drug use: No    Sexual activity: Not on file         Review of Systems   Constitutional:  Positive for diaphoresis (resolved).   HENT:   Positive for congestion and sore throat.    Respiratory:  Positive for cough.    Cardiovascular:  Positive for chest pain (resolved).   Gastrointestinal:  Negative for abdominal pain, nausea and vomiting.     Objective:     Vital Signs (Most Recent):  Temp: 98.2 °F (36.8 °C) (05/14/25 2040)  Pulse: 81 (05/14/25 2059)  Resp: (!) 21 (05/14/25 2059)  BP: (!) 160/118 (05/14/25 2059)  SpO2: 95 % (05/14/25 2059) Vital Signs (24h Range):  Temp:  [98.2 °F (36.8 °C)] 98.2 °F (36.8 °C)  Pulse:  [71-81] 81  Resp:  [16-21] 21  SpO2:  [95 %-99 %] 95 %  BP: (160-179)/(118) 160/118        Body mass index is 28.8 kg/m².    No intake or output data in the 24 hours ending 05/14/25 2157     Physical Exam  Vitals and nursing note reviewed.   Constitutional:       General: He is awake. He is not in acute distress.  HENT:      Head: Atraumatic.      Nose: Mucosal edema and congestion present.   Eyes:      Conjunctiva/sclera:      Right eye: Right conjunctiva is injected.      Left eye: Left conjunctiva is injected.   Neck:      Vascular: No JVD.   Cardiovascular:      Rate and Rhythm: Normal rate and regular rhythm.      Pulses:           Radial pulses are 2+ on the right side and 2+ on the left side.        Dorsalis pedis pulses are 2+ on the right side and 2+ on the left side.   Pulmonary:      Effort: Pulmonary effort is normal.      Breath sounds: Normal breath sounds.   Abdominal:      General: Bowel sounds are normal. There is no distension.      Palpations: Abdomen is soft.      Tenderness: There is no abdominal tenderness.   Musculoskeletal:      Right lower leg: No edema.      Left lower leg: No edema.   Skin:     General: Skin is warm and dry.      Capillary Refill: Capillary refill takes less than 2 seconds.          Neurological:      Mental Status: He is alert. Mental status is at baseline.      GCS: GCS eye subscore is 4. GCS verbal subscore is 5. GCS motor subscore is 6.      Cranial Nerves: Cranial nerves 2-12 are intact.    Psychiatric:         Attention and Perception: Attention normal.         Mood and Affect: Mood is anxious. Affect is tearful.         Speech: Speech normal.         Behavior: Behavior normal. Behavior is cooperative.         Thought Content: Thought content normal.        Vents:       Lines/Drains/Airways       Peripheral Intravenous Line  Duration                  Peripheral IV - Single Lumen 05/14/25 2045 18 G Right Antecubital <1 day         Peripheral IV - Single Lumen 05/14/25 2055 20 G 1 1/4 in Left Antecubital <1 day                    Significant Labs:    CBC/Anemia Profile:  Recent Labs   Lab 05/14/25 2049   WBC 7.23   HGB 15.0   HCT 43.4      MCV 81*   RDW 13.0        Chemistries:  Recent Labs   Lab 05/14/25 2049   *   K 3.7      CO2 20*   BUN 16   CREATININE 1.3   CALCIUM 9.6   ALBUMIN 4.7   PROT 8.3   BILITOT 1.1*   ALKPHOS 57   ALT 33   AST 21       Troponin:   Recent Labs   Lab 05/14/25 2049   TROPONINI <0.006     All pertinent labs within the past 24 hours have been reviewed.    Significant Imaging:   I have reviewed all pertinent imaging results/findings within the past 24 hours.     ABG  Recent Labs   Lab 05/14/25 2059   PH 7.411   PO2 26*   PCO2 39.6   HCO3 25.2   BE 1     Assessment/Plan:     ENT  Sore throat  With nasal congestion  Denies history of environmental allergies, does work outdoors    Suspect viral etiology; will rule out strep throat  Trial flonase for nasal congestion; adjust if not helpful  Avoid decongestants that effect BP    Cardiac/Vascular  * STEMI (ST elevation myocardial infarction)  Non obstructive CAD on emergent LHC with EF 55%  Believe STEMI is direct result of malignant hypertension; see plan for management above  Continuous ICU cardiac monitoring    Hypertensive emergency  Patient has a current diagnosis of Hypertensive emergency with end organ damage evidenced by acute coronary syndrome which is uncontrolled.    He has been taking over the  counter cold/congestion med (dayquil) which likely contributed to BP elevation. We discussed risks along with safe cough/congestion medication for future.    Latest blood pressure and vitals reviewed-   Temp:  [98.2 °F (36.8 °C)-98.4 °F (36.9 °C)]   Pulse:  []   Resp:  [16-21]   BP: (146-179)/()   SpO2:  [95 %-99 %] .     Home meds for hypertension were reviewed currently taking losartan daily     Patient currently on IV antihypertensives - Cardene infusion, titrate for goal SBP < 140  Medication adjustment for hospital antihypertensives is as follows- Discussed with Cardiology will continue cardene drip, initiate coreg, Plan to adjust further in am pending response to coreg.    Will aim for controlled BP reduction by medications noted above. Monitor and mitigate end organ damage as indicated.    Other  Tobacco smoker, less than 10 cigarettes per day  Reports one to two cigarettes per day  We discussed specific cardiac risks of nicotine   He verbalizes understanding and goal of immediate cessation        Critical Care Daily Checklist:    A: Awake: RASS Goal/Actual Goal:    Actual:     B: Spontaneous Breathing Trial Performed?     C: SAT & SBT Coordinated?  N/a                      D: Delirium: CAM-ICU     E: Early Mobility Performed? Yes   F: Feeding Goal:    Status:     Current Diet Order   Procedures    Diet Heart Healthy Standard Tray     Tray type::   Standard Tray      AS: Analgesia/Sedation prn   T: Thromboembolic Prophylaxis Lovenox to start tomorrow   H: HOB > 300 Yes   U: Stress Ulcer Prophylaxis (if needed) PPI continuation of home therapy   G: Glucose Control monitor   B: Bowel Function     I: Indwelling Catheter (Lines & Shen) Necessity reviewed   D: De-escalation of Antimicrobials/Pharmacotherapies reviewed    Plan for the day/ETD As above    Code Status:  Family/Goals of Care: Full Code  Home on discharge     Critical Care Time: 45 minutes  Critical secondary to hypertensive emergency with  STEMI   Critical care was time spent personally by me on the following activities: development of treatment plan with patient or surrogate and bedside caregivers, discussions with consultants, evaluation of patient's response to treatment, examination of patient, ordering and performing treatments and interventions, ordering and review of laboratory studies, ordering and review of radiographic studies, pulse oximetry, re-evaluation of patient's condition. This critical care time did not overlap with that of any other provider or involve time for any procedures.    Thank you for your consult. Critical Care team will assist with medical management while requiring ICU level care.     ROJAS Fabian-BC  Critical Care Medicine  [facility] ([facility])

## 2025-05-15 NOTE — PROGRESS NOTES
[facility] ([facility])  Cardiology  Progress Note    Patient Name: Jose Bloom  MRN: [id number]  Admission Date: 5/14/2025  Hospital Length of Stay: 1 days  Code Status: Full Code   Attending Physician: Christ Butts MD   Primary Care Physician: Rome, Rachele  Expected Discharge Date:   Principal Problem:STEMI (ST elevation myocardial infarction)    Subjective:   HPI:  40 smoker, htn , here for stemi inferior   No prior available ecg  Does marijuana denies drugs or meth   No stroke     Hospital Course:   5/15/2025-Patient seen and examined today, s/p LHC which showed non-obs CAD. Feels ok. Reported some mild CP/SOB overnight, improved. Off Cardene drip. Meds adjusted. Labs reviewed.         Review of Systems   Constitutional: Positive for malaise/fatigue.   HENT: Negative.     Eyes: Negative.    Cardiovascular:  Positive for chest pain and dyspnea on exertion (improved).   Respiratory: Negative.     Endocrine: Negative.    Hematologic/Lymphatic: Negative.    Skin: Negative.    Musculoskeletal: Negative.    Gastrointestinal: Negative.    Genitourinary: Negative.    Neurological: Negative.    Psychiatric/Behavioral: Negative.     Allergic/Immunologic: Negative.      Objective:     Vital Signs (Most Recent):  Temp: 98.5 °F (36.9 °C) (05/15/25 0715)  Pulse: 80 (05/15/25 1100)  Resp: 20 (05/15/25 1100)  BP: 125/80 (05/15/25 1100)  SpO2: 97 % (05/15/25 1100) Vital Signs (24h Range):  Temp:  [98.1 °F (36.7 °C)-98.5 °F (36.9 °C)] 98.5 °F (36.9 °C)  Pulse:  [] 80  Resp:  [13-37] 20  SpO2:  [92 %-99 %] 97 %  BP: (121-179)/() 125/80     Weight: 108 kg (238 lb)  Body mass index is 31.4 kg/m².     SpO2: 97 %         Intake/Output Summary (Last 24 hours) at 5/15/2025 1318  Last data filed at 5/15/2025 1054  Gross per 24 hour   Intake 1555.23 ml   Output 3150 ml   Net -1594.77 ml       Lines/Drains/Airways       Peripheral Intravenous Line  Duration                  Peripheral IV - Single  "Lumen 05/14/25 2045 18 G Right Antecubital <1 day         Peripheral IV - Single Lumen 05/14/25 2055 20 G 1 1/4 in Left Antecubital <1 day                       Physical Exam  Vitals and nursing note reviewed.   Constitutional:       General: He is not in acute distress.     Appearance: Normal appearance. He is well-developed. He is not diaphoretic.   HENT:      Head: Normocephalic and atraumatic.   Eyes:      General:         Right eye: No discharge.         Left eye: No discharge.      Pupils: Pupils are equal, round, and reactive to light.   Cardiovascular:      Rate and Rhythm: Normal rate and regular rhythm.      Heart sounds: Normal heart sounds, S1 normal and S2 normal. No murmur heard.  Pulmonary:      Effort: Pulmonary effort is normal. No respiratory distress.      Breath sounds: Normal breath sounds.   Musculoskeletal:      Right lower leg: No edema.      Left lower leg: No edema.   Skin:     General: Skin is warm and dry.      Findings: No erythema.      Comments: R groin access site C/D/I; no bleeding erythema or drainage; intact pulse   Neurological:      Mental Status: He is alert and oriented to person, place, and time.   Psychiatric:         Mood and Affect: Mood normal.         Behavior: Behavior normal.         Thought Content: Thought content normal.            Significant Labs: CMP   Recent Labs   Lab 05/14/25  2049 05/15/25  0326   * 137   K 3.7 3.5    106   CO2 20* 19*   GLU 92 114*   BUN 16 13   CREATININE 1.3 0.9   CALCIUM 9.6 9.1   PROT 8.3  --    ALBUMIN 4.7  --    BILITOT 1.1*  --    ALKPHOS 57  --    AST 21  --    ALT 33  --    ANIONGAP 12 12   , CBC   Recent Labs   Lab 05/14/25  2049 05/15/25  0326   WBC 7.23 7.97   HGB 15.0 14.4   HCT 43.4 40.9    232   , Troponin No results for input(s): "TROPONINIHS" in the last 48 hours., and All pertinent lab results from the last 24 hours have been reviewed.    Significant Imaging: Echocardiogram: Transthoracic echo (TTE) " complete (Cupid Only):   Results for orders placed or performed during the hospital encounter of 05/14/25   Echo   Result Value Ref Range    BSA 2.36 m2    LA WIDTH 3.7 cm    LVOT stroke volume 91.7 cm3    LVIDd 4.5 3.5 - 6.0 cm    LV Systolic Volume 28 mL    LV Systolic Volume Index 12.1 mL/m2    LVIDs 2.7 2.1 - 4.0 cm    LV ESV A2C 45.23 mL    LV Diastolic Volume 90 mL    LV ESV A4C 34.97 mL    LV Diastolic Volume Index 38.79 mL/m2    Left Ventricular End Systolic Volume by Teichholz Method 27.69 mL    Left Ventricular End Diastolic Volume by Teichholz Method 90.35 mL    IVS 1.3 (A) 0.6 - 1.1 cm    LVOT diameter 2.5 cm    LVOT area 4.9 cm2    FS 40.0 28 - 44 %    Left Ventricle Relative Wall Thickness 0.67 cm    PW 1.5 (A) 0.6 - 1.1 cm    LV mass 248.4 g    LV Mass Index 107.1 g/m2    MV Peak E Syed 0.65 m/s    TDI LATERAL 0.10 m/s    TDI SEPTAL 0.10 m/s    E/E' ratio 7 m/s    MV Peak A Syed 0.60 m/s    E/A ratio 1.08     IVRT 67 msec    E wave deceleration time 335 msec    LV SEPTAL E/E' RATIO 6.5 m/s    SHABANA 25 mL/m2    LV LATERAL E/E' RATIO 6.5 m/s    LA Vol 59 cm3    LVOT peak syed 1.0 m/s    Left Ventricular Outflow Tract Mean Velocity 0.74 cm/s    Left Ventricular Outflow Tract Mean Gradient 2.48 mmHg    RVOT peak VTI 15.2 cm    TAPSE 2.3 cm    LA size 3.4 cm    Left Atrium Minor Axis 5.7 cm    Left Atrium Major Axis 5.3 cm    LA Vol (MOD) 40 mL    SHABANA (MOD) 17 mL/m2    RA Major Axis 4.43 cm    AV mean gradient 7 mmHg    AV peak gradient 13 mmHg    Ao peak syed 1.8 m/s    Ao VTI 31.2 cm    LVOT peak VTI 18.7 cm    AV valve area 2.9 cm²    AV Velocity Ratio 0.56     AV index (prosthetic) 0.60     OC by Velocity Ratio 2.7 cm²    MV stenosis pressure 1/2 time 97.23 ms    MV valve area p 1/2 method 2.26 cm2    PV mean gradient 2 mmHg    PV peak gradient 3     RVOT peak syed 0.84 m/s    Ao root annulus 3.4 cm    STJ 3.1 cm    Ascending aorta 3.0 cm    ASI 1.3 cm/m2    IVC diameter 1.10 cm    Mean e' 0.10 m/s     ZLVIDS -5.74     ZLVIDD -7.21     LA area A4C 15.29 cm2    LA area A2C 17.29 cm2    RA Width 2.7 cm    and EKG: Reviewed  Assessment and Plan:   Patient who presents with abnormal EKG/HTN emergency. LHC showed non-obs CAD. Meds adjusted. Transfer to telemetry.    Abnormal EKG  -Initial EKG concerning for STEMI  -s/p LHC which showed non-obs CAD  -Off Cardende gtt  -Continue ASA, statin, BB, ARB  -Transfer to telemetry    Tobacco smoker, less than 10 cigarettes per day  -Cessation advised    Hypertensive emergency  -BP improving, Coreg and ARB added  -Monitor trend        VTE Risk Mitigation (From admission, onward)           Ordered     enoxaparin injection 40 mg  Daily         05/14/25 2234     heparin (porcine) injection  Code/trauma/sedation medication         05/14/25 2056                    Barbra Malin PA-C  Cardiology  [facility] ([facility])

## 2025-05-15 NOTE — EICU
"Virtual ICU Admission    Admit Date: 2025  LOS: 0  Code Status: No Order   : 1985  Bed: A  07:     Diagnosis: STEMI (ST elevation myocardial infarction)    Patient  has a past medical history of Hypertension and Murmur, cardiac.    Last VS: BP (!) 146/91 (BP Location: Right arm, Patient Position: Lying)   Pulse 100   Temp 98.4 °F (36.9 °C) (Oral)   Resp 18   Ht 6' 1" (1.854 m)   SpO2 96%   BMI 28.80 kg/m²       VICU Review    VICU nurse assessment :  [place] completed, LDA documentation reconciliation completed, and VTE prophylaxis review                  "

## 2025-05-15 NOTE — ED PROVIDER NOTES
I reviewed the H&P, I examined the patient, and there are no changes in the patient's condition. SCRIBE #1 NOTE: I, Yovany Cui, am scribing for, and in the presence of, Justin Romano MD. I have scribed the entire note.       History     Chief Complaint   Patient presents with    Chest Pain     Chest pain and SOB x 3 days;  sent by  for abnormal EKG showing STEMI but pt refused ambulance per ; took 325 ASA at      Review of patient's allergies indicates:  No Known Allergies      History of Present Illness     HPI    5/14/2025, 8:43 PM  History obtained from the patient and medical records      History of Present Illness: Jose Bloom is a 40 y.o. male patient with a PMHx of HTN and a heart murmur who presents to the Emergency Department for evaluation of chest pain which began three days ago, but has worsened today (10/10). Associated sxs include SOB, light headedness, and nausea. Pt was seen at urgent care today with an EKG showing ST elevation. He refused any treatment other than ASA and was brought to the ED by his wife. No mitigating or exacerbating factors reported. Patient denies any leg swelling, vomiting, or diaphoresis. Prior Tx includes 325 ASA.  Pt denies known history of HLD or cardiac disease. No further complaints or concerns at this time.       Arrival mode: Personal Transportation    PCP: Rachele Peter        Past Medical History:  Past Medical History:   Diagnosis Date    Hypertension     Murmur, cardiac        Past Surgical History:  No past surgical history on file.      Family History:  Family History   Problem Relation Name Age of Onset    Hypertension Mother      Hypertension Father         Social History:  Social History     Tobacco Use    Smoking status: Former    Smokeless tobacco: Never   Substance and Sexual Activity    Alcohol use: No    Drug use: No    Sexual activity: Not on file        Review of Systems     Review of Systems   Constitutional:  Negative for diaphoresis and fever.   HENT:  Negative for sore throat.    Respiratory:  Positive for  "chest tightness and shortness of breath.    Cardiovascular:  Negative for chest pain and leg swelling.   Gastrointestinal:  Positive for nausea. Negative for vomiting.   Genitourinary:  Negative for dysuria.   Musculoskeletal:  Negative for back pain.   Skin:  Negative for rash.   Neurological:  Positive for light-headedness. Negative for weakness.   Hematological:  Does not bruise/bleed easily.   All other systems reviewed and are negative.     Physical Exam     Initial Vitals [05/14/25 2040]   BP Pulse Resp Temp SpO2   (!) 179/118 71 16 98.2 °F (36.8 °C) 99 %      MAP       --          Physical Exam  Nursing Notes and Vital Signs Reviewed.  Constitutional: Patient is in no acute distress. Well-developed and well-nourished.  Head: Atraumatic. Normocephalic.  Eyes: PERRL. EOM intact. Conjunctivae are not pale. No scleral icterus.  ENT: Mucous membranes are moist. Oropharynx is clear and symmetric.    Neck: Supple. Full ROM. No lymphadenopathy.  Cardiovascular: Regular rate. Regular rhythm. No murmurs, rubs, or gallops. Distal pulses are 2+ and symmetric.  Pulmonary/Chest: No respiratory distress. Clear to auscultation bilaterally. No wheezing or rales.  Abdominal: Soft and non-distended.  There is no tenderness.  No rebound, guarding, or rigidity. Good bowel sounds.  Genitourinary: No CVA tenderness.  Musculoskeletal: Moves all extremities. No obvious deformities. No edema. No calf tenderness.  Skin: Warm and dry.  Neurological:  Alert, awake, and appropriate.  Normal speech.  No acute focal neurological deficits are appreciated.  Psychiatric: Normal affect. Good eye contact. Appropriate in content.     ED Course   Procedures  ED Vital Signs:  Vitals:    05/14/25 2040 05/14/25 2045 05/14/25 2046 05/14/25 2059   BP: (!) 179/118   (!) 160/118   Pulse: 71 80 79 81   Resp: 16  20 (!) 21   Temp: 98.2 °F (36.8 °C)      TempSrc: Oral      SpO2: 99%  96% 95%   Height:   6' 1" (1.854 m)        Abnormal Lab Results:  Labs " Reviewed   CBC WITH DIFFERENTIAL - Abnormal       Result Value    WBC 7.23      RBC 5.35      HGB 15.0      HCT 43.4      MCV 81 (*)     MCH 28.0      MCHC 34.6      RDW 13.0      Platelet Count 258      MPV 10.3      Nucleated RBC 0      Neut % 64.9      Lymph % 19.5      Mono % 12.7      Eos % 2.2      Basophil % 0.4      Imm Grans % 0.3      Neut # 4.69      Lymph # 1.41      Mono # 0.92      Eos # 0.16      Baso # 0.03      Imm Grans # 0.02     ISTAT PROCEDURE - Abnormal    POC PH 7.411      POC PCO2 39.6      POC PO2 26 (*)     POC HCO3 25.2      POC BE 1      POC SATURATED O2 47      Sample VENOUS      Site Other      Allens Test N/A      DelSys Room Air     CBC W/ AUTO DIFFERENTIAL    Narrative:     The following orders were created for panel order CBC auto differential.  Procedure                               Abnormality         Status                     ---------                               -----------         ------                     CBC with Differential[[id number]]       Abnormal            Final result                 Please view results for these tests on the individual orders.   EXTRA TUBES    Narrative:     The following orders were created for panel order EXTRA TUBES.  Procedure                               Abnormality         Status                     ---------                               -----------         ------                     Light Green Top Hold[[id number]]                                                       Gold Top Hold[[id number]]                                                              Gold Top Hold[[id number]]                                                                Please view results for these tests on the individual orders.   LIGHT GREEN TOP HOLD   GOLD TOP HOLD   GOLD TOP HOLD   TYPE & SCREEN        All Lab Results:  Results for orders placed or performed during the hospital encounter of 05/14/25   Comprehensive metabolic panel    Collection Time: 05/14/25   8:49 PM   Result Value Ref Range    Sodium 134 (L) 136 - 145 mmol/L    Potassium 3.7 3.5 - 5.1 mmol/L    Chloride 102 95 - 110 mmol/L    CO2 20 (L) 23 - 29 mmol/L    Glucose 92 70 - 110 mg/dL    BUN 16 6 - 20 mg/dL    Creatinine 1.3 0.5 - 1.4 mg/dL    Calcium 9.6 8.7 - 10.5 mg/dL    Protein Total 8.3 6.0 - 8.4 gm/dL    Albumin 4.7 3.5 - 5.2 g/dL    Bilirubin Total 1.1 (H) 0.1 - 1.0 mg/dL    ALP 57 40 - 150 unit/L    AST 21 11 - 45 unit/L    ALT 33 10 - 44 unit/L    Anion Gap 12 8 - 16 mmol/L    eGFR >60 >60 mL/min/1.73/m2   Brain Natriuretic Peptide    Collection Time: 05/14/25  8:49 PM   Result Value Ref Range    BNP <10 0 - 99 pg/mL   Troponin I    Collection Time: 05/14/25  8:49 PM   Result Value Ref Range    Troponin-I <0.006 <=0.026 ng/mL   Protime-INR    Collection Time: 05/14/25  8:49 PM   Result Value Ref Range    PT 10.8 9.0 - 12.5 seconds    INR 0.9 0.8 - 1.2   CBC with Differential    Collection Time: 05/14/25  8:49 PM   Result Value Ref Range    WBC 7.23 3.90 - 12.70 K/uL    RBC 5.35 4.60 - 6.20 M/uL    HGB 15.0 14.0 - 18.0 gm/dL    HCT 43.4 40.0 - 54.0 %    MCV 81 (L) 82 - 98 fL    MCH 28.0 27.0 - 31.0 pg    MCHC 34.6 32.0 - 36.0 g/dL    RDW 13.0 11.5 - 14.5 %    Platelet Count 258 150 - 450 K/uL    MPV 10.3 9.2 - 12.9 fL    Nucleated RBC 0 <=0 /100 WBC    Neut % 64.9 38 - 73 %    Lymph % 19.5 18 - 48 %    Mono % 12.7 4 - 15 %    Eos % 2.2 <=8 %    Basophil % 0.4 <=1.9 %    Imm Grans % 0.3 0.0 - 0.5 %    Neut # 4.69 1.8 - 7.7 K/uL    Lymph # 1.41 1 - 4.8 K/uL    Mono # 0.92 0.3 - 1 K/uL    Eos # 0.16 <=0.5 K/uL    Baso # 0.03 <=0.2 K/uL    Imm Grans # 0.02 0.00 - 0.04 K/uL   APTT    Collection Time: 05/14/25  8:49 PM   Result Value Ref Range    PTT 28.5 21.0 - 32.0 seconds   ISTAT PROCEDURE    Collection Time: 05/14/25  8:59 PM   Result Value Ref Range    POC PH 7.411 7.35 - 7.45    POC PCO2 39.6 35 - 45 mmHg    POC PO2 26 (L) 40 - 60 mmHg    POC HCO3 25.2 24 - 28 mmol/L    POC BE 1 -2 to 2 mmol/L     POC SATURATED O2 47 95 - 100 %    Sample VENOUS     Site Other     Allens Test N/A     DelSys Room Air    Cardiac catheterization    Collection Time: 05/14/25  9:36 PM   Result Value Ref Range    Cath EF Estimated 60 %    Cath EF Quantitative 65 %       Imaging Results:  Imaging Results    None          The EKG was ordered, reviewed, and independently interpreted by the ED provider.  Interpretation time: 20:39  Rate: 84 BPM  Rhythm: normal sinus rhythm  Interpretation: ST elevation consider inferolateral injury or acute infarct. ACUTE MI / STEMI. Abnormal ECG.           The Emergency Provider reviewed the vital signs and test results, which are outlined above.     ED Discussion     8:59 PM: Discussed case with Christ Butts MD (Cardiology). Dr. Butts agrees with current care and management of pt and accepts admission.   Admitting Service: Cardiology  Admitting Physician: Dr. Butts  Admit to: Inpatient    9:06 PM: Re-evaluated pt.  I have discussed test results, shared treatment plan, and the need for admission with patient/family/caretaker at bedside. Pt and/or family/caretaker express understanding at this time and agree with all information. All questions answered. Pt/caretaker/family member(s) have no further questions or concerns at this time. Pt is ready for admit.       Medical Decision Making  DDx: chest pain, NSTEMI    Amount and/or Complexity of Data Reviewed  Labs: ordered. Decision-making details documented in ED Course.  Radiology: ordered. Decision-making details documented in ED Course.  ECG/medicine tests: ordered and independent interpretation performed. Decision-making details documented in ED Course.  Discussion of management or test interpretation with external provider(s): ST Elevations in the inferior leads.  Cardiology immediately consulted and taken to the cath lab.      Risk  OTC drugs.  Prescription drug management.  Decision regarding hospitalization.                ED  Medication(s):  Medications   0.9% NaCl infusion (has no administration in time range)   heparin (porcine) injection (4,000 Units Intravenous Given 5/14/25 2056)   midazolam injection (1 mg Intravenous Given 5/14/25 2130)   fentaNYL 50 mcg/mL injection (50 mcg Intravenous Given 5/14/25 2134)   diphenhydrAMINE injection (25 mg Intravenous Given 5/14/25 2121)   LIDOcaine (PF) 20 mg/ml (2%) injection (5 mLs Subcutaneous Given 5/14/25 2116)   hydrALAZINE injection (20 mg Intravenous Given 5/14/25 2131)   acetaminophen tablet 650 mg (has no administration in time range)   ondansetron disintegrating tablet 8 mg (has no administration in time range)   0.9% NaCl infusion (has no administration in time range)   labetaloL injection (20 mg Intravenous Given 5/14/25 2136)   niCARdipine 40 mg/200 mL (0.2 mg/mL) infusion (has no administration in time range)   niCARdipine 40 mg/200 mL infusion (5 mg/hr Intravenous New Bag 5/14/25 2154)   clopidogreL tablet 600 mg (600 mg Oral Given 5/14/25 2053)   hydrALAZINE injection 20 mg (20 mg Intravenous Given 5/14/25 2059)       Current Discharge Medication List                  Scribe Attestation:   Scribe #1: I performed the above scribed service and the documentation accurately describes the services I performed. I attest to the accuracy of the note.     Attending:   Physician Attestation Statement for Scribe #1: I, Justin Romano MD, personally performed the services described in this documentation, as scribed by Yovany Cui, in my presence, and it is both accurate and complete.           Clinical Impression       ICD-10-CM ICD-9-CM   1. ST elevation myocardial infarction (STEMI), unspecified artery  I21.3 410.90   2. Chest pain  R07.9 786.50   3. STEMI (ST elevation myocardial infarction)  I21.3 410.90       Disposition:   Disposition: Admitted  Condition: Justin Wyatt MD  05/14/25 2154

## 2025-05-15 NOTE — H&P
SUBJECTIVE:     Procedure: [facility]     Chief Complaint/Diagnosis:   40 smoker, htn , here for stemi inferior   No prior available ecg  Does marijuana denies drugs or meth   No stroke   10/10 chest pain and diaphoresis    PTA Medications   Medication Sig    carvedilol (COREG) 12.5 MG tablet Take 1 tablet (12.5 mg total) by mouth 2 (two) times daily with meals.    lisinopril-hydrochlorothiazide (PRINZIDE,ZESTORETIC) 20-12.5 mg per tablet Take 1 tablet by mouth once daily.    meloxicam (MOBIC) 7.5 MG tablet Take 1 tablet (7.5 mg total) by mouth 2 (two) times daily as needed for Pain.       Review of patient's allergies indicates:  No Known Allergies    Past Medical History:   Diagnosis Date    Hypertension     Murmur, cardiac      No past surgical history on file.  Family History   Problem Relation Name Age of Onset    Hypertension Mother      Hypertension Father       Social History[1]     Review of Systems:  Chest pain    OBJECTIVE:     Vital Signs (Most Recent)  Temp: 98.2 °F (36.8 °C) (05/14/25 2040)  Pulse: 79 (05/14/25 2046)  Resp: 20 (05/14/25 2046)  BP: (!) 160/118 (05/14/25 2059)  SpO2: 96 % (05/14/25 2046)    Physical Exam:  In distress       ST elevation myocardial infarction (STEMI), unspecified artery  -     Cancel: Case Request-Cath Lab: ANGIOGRAM, CORONARY ARTERY; Standing    Chest pain  -     EKG 12-lead; Standing    STEMI (ST elevation myocardial infarction)  -     Cardiac catheterization; Standing    Other orders  -     Pulse Oximetry Continuous; Standing  -     CBC auto differential; Standing  -     Comprehensive metabolic panel; Standing  -     Brain Natriuretic Peptide; Standing  -     Troponin I; Standing  -     Protime-INR; Standing  -     Saline lock IV; Standing  -     X-Ray Chest AP Portable; Standing  -     Notify physician for persistent pain; Standing  -     Cardiac Monitoring - Adult; Standing  -     ED Preference List to Initate STEMI Orders; Standing  -     Vital signs Q15min x 4;  Standing  -     Vital signs Q30min x 2; Standing  -     Vital signs; Standing  -     Diet NPO; Standing  -     2nd Saline lock IV ; Standing  -     Oxygen PRN; Standing  -     Cancel: CBC auto differential; Standing  -     Cancel: Comprehensive metabolic panel; Standing  -     POCT Venous Blood Gas - Lactate; Standing  -     Cancel: Protime-INR; Standing  -     Cancel: APTT; Standing  -     Type & Screen; Standing  -     Inpatient consult to Cardiology; Standing  -     Discontinue: aspirin tablet 325 mg  -     clopidogreL tablet 600 mg  -     Discontinue: heparin (porcine) injection 80 Units/kg (Dosing Weight)  -     0.9% NaCl infusion  -     Prepare patient for Cath Lab: clip groin; Standing  -     Cancel: Place in Observation; Standing  -     APTT; Standing  -     hydrALAZINE injection 20 mg  -     Discontinue: heparin (porcine) injection 4,000 Units  -     heparin (porcine) injection  -     Admit to Inpatient; Standing  -     EXTRA TUBES; Standing  -     ISTAT PROCEDURE; Standing      Lhc/pci consented        [1]   Social History  Tobacco Use    Smoking status: Former    Smokeless tobacco: Never   Substance Use Topics    Alcohol use: No    Drug use: No

## 2025-05-15 NOTE — PLAN OF CARE
Pt did well overnight. NSR on monitor. SBP at goal <140 on Cardene weaned down to 2.5mg/hr. Occasional c/o chest tightness 3-5/10, able to sleep b/t care. R groin site WNL. Afebrile. Tolerating RA. Nasal spray ordered for congestion. Voids independently, 950mL UOP per urinal. PRN Zofran x 1 for nausea. Turns independently in bed. Fall precautions in place, bed alarm on.

## 2025-05-15 NOTE — PLAN OF CARE
Cardene gtt off this AM. SBP remains <140. Pt A&OX4, SR on monitor, on RA. Voiding to urinal. R. Groin puncture site CDI, and without hematoma. Pt wife at bedside. Downgrade orders given. Call light within reach. Fall and safety precautions in place.

## 2025-05-15 NOTE — SUBJECTIVE & OBJECTIVE
Review of Systems   Constitutional: Positive for malaise/fatigue.   HENT: Negative.     Eyes: Negative.    Cardiovascular:  Positive for chest pain and dyspnea on exertion (improved).   Respiratory: Negative.     Endocrine: Negative.    Hematologic/Lymphatic: Negative.    Skin: Negative.    Musculoskeletal: Negative.    Gastrointestinal: Negative.    Genitourinary: Negative.    Neurological: Negative.    Psychiatric/Behavioral: Negative.     Allergic/Immunologic: Negative.      Objective:     Vital Signs (Most Recent):  Temp: 98.5 °F (36.9 °C) (05/15/25 0715)  Pulse: 80 (05/15/25 1100)  Resp: 20 (05/15/25 1100)  BP: 125/80 (05/15/25 1100)  SpO2: 97 % (05/15/25 1100) Vital Signs (24h Range):  Temp:  [98.1 °F (36.7 °C)-98.5 °F (36.9 °C)] 98.5 °F (36.9 °C)  Pulse:  [] 80  Resp:  [13-37] 20  SpO2:  [92 %-99 %] 97 %  BP: (121-179)/() 125/80     Weight: 108 kg (238 lb)  Body mass index is 31.4 kg/m².     SpO2: 97 %         Intake/Output Summary (Last 24 hours) at 5/15/2025 1318  Last data filed at 5/15/2025 1054  Gross per 24 hour   Intake 1555.23 ml   Output 3150 ml   Net -1594.77 ml       Lines/Drains/Airways       Peripheral Intravenous Line  Duration                  Peripheral IV - Single Lumen 05/14/25 2045 18 G Right Antecubital <1 day         Peripheral IV - Single Lumen 05/14/25 2055 20 G 1 1/4 in Left Antecubital <1 day                       Physical Exam  Vitals and nursing note reviewed.   Constitutional:       General: He is not in acute distress.     Appearance: Normal appearance. He is well-developed. He is not diaphoretic.   HENT:      Head: Normocephalic and atraumatic.   Eyes:      General:         Right eye: No discharge.         Left eye: No discharge.      Pupils: Pupils are equal, round, and reactive to light.   Cardiovascular:      Rate and Rhythm: Normal rate and regular rhythm.      Heart sounds: Normal heart sounds, S1 normal and S2 normal. No murmur heard.  Pulmonary:       "Effort: Pulmonary effort is normal. No respiratory distress.      Breath sounds: Normal breath sounds.   Musculoskeletal:      Right lower leg: No edema.      Left lower leg: No edema.   Skin:     General: Skin is warm and dry.      Findings: No erythema.      Comments: R groin access site C/D/I; no bleeding erythema or drainage; intact pulse   Neurological:      Mental Status: He is alert and oriented to person, place, and time.   Psychiatric:         Mood and Affect: Mood normal.         Behavior: Behavior normal.         Thought Content: Thought content normal.            Significant Labs: CMP   Recent Labs   Lab 05/14/25  2049 05/15/25  0326   * 137   K 3.7 3.5    106   CO2 20* 19*   GLU 92 114*   BUN 16 13   CREATININE 1.3 0.9   CALCIUM 9.6 9.1   PROT 8.3  --    ALBUMIN 4.7  --    BILITOT 1.1*  --    ALKPHOS 57  --    AST 21  --    ALT 33  --    ANIONGAP 12 12   , CBC   Recent Labs   Lab 05/14/25  2049 05/15/25  0326   WBC 7.23 7.97   HGB 15.0 14.4   HCT 43.4 40.9    232   , Troponin No results for input(s): "TROPONINIHS" in the last 48 hours., and All pertinent lab results from the last 24 hours have been reviewed.    Significant Imaging: Echocardiogram: Transthoracic echo (TTE) complete (Cupid Only):   Results for orders placed or performed during the hospital encounter of 05/14/25   Echo   Result Value Ref Range    BSA 2.36 m2    LA WIDTH 3.7 cm    LVOT stroke volume 91.7 cm3    LVIDd 4.5 3.5 - 6.0 cm    LV Systolic Volume 28 mL    LV Systolic Volume Index 12.1 mL/m2    LVIDs 2.7 2.1 - 4.0 cm    LV ESV A2C 45.23 mL    LV Diastolic Volume 90 mL    LV ESV A4C 34.97 mL    LV Diastolic Volume Index 38.79 mL/m2    Left Ventricular End Systolic Volume by Teichholz Method 27.69 mL    Left Ventricular End Diastolic Volume by Teichholz Method 90.35 mL    IVS 1.3 (A) 0.6 - 1.1 cm    LVOT diameter 2.5 cm    LVOT area 4.9 cm2    FS 40.0 28 - 44 %    Left Ventricle Relative Wall Thickness 0.67 cm    PW " 1.5 (A) 0.6 - 1.1 cm    LV mass 248.4 g    LV Mass Index 107.1 g/m2    MV Peak E Syed 0.65 m/s    TDI LATERAL 0.10 m/s    TDI SEPTAL 0.10 m/s    E/E' ratio 7 m/s    MV Peak A Syed 0.60 m/s    E/A ratio 1.08     IVRT 67 msec    E wave deceleration time 335 msec    LV SEPTAL E/E' RATIO 6.5 m/s    SHABANA 25 mL/m2    LV LATERAL E/E' RATIO 6.5 m/s    LA Vol 59 cm3    LVOT peak syed 1.0 m/s    Left Ventricular Outflow Tract Mean Velocity 0.74 cm/s    Left Ventricular Outflow Tract Mean Gradient 2.48 mmHg    RVOT peak VTI 15.2 cm    TAPSE 2.3 cm    LA size 3.4 cm    Left Atrium Minor Axis 5.7 cm    Left Atrium Major Axis 5.3 cm    LA Vol (MOD) 40 mL    SHABANA (MOD) 17 mL/m2    RA Major Axis 4.43 cm    AV mean gradient 7 mmHg    AV peak gradient 13 mmHg    Ao peak syed 1.8 m/s    Ao VTI 31.2 cm    LVOT peak VTI 18.7 cm    AV valve area 2.9 cm²    AV Velocity Ratio 0.56     AV index (prosthetic) 0.60     OC by Velocity Ratio 2.7 cm²    MV stenosis pressure 1/2 time 97.23 ms    MV valve area p 1/2 method 2.26 cm2    PV mean gradient 2 mmHg    PV peak gradient 3     RVOT peak syed 0.84 m/s    Ao root annulus 3.4 cm    STJ 3.1 cm    Ascending aorta 3.0 cm    ASI 1.3 cm/m2    IVC diameter 1.10 cm    Mean e' 0.10 m/s    ZLVIDS -5.74     ZLVIDD -7.21     LA area A4C 15.29 cm2    LA area A2C 17.29 cm2    RA Width 2.7 cm    and EKG: Reviewed

## 2025-05-15 NOTE — SUBJECTIVE & OBJECTIVE
Interval History: no acute events  No complaints       Objective:     Vital Signs (Most Recent):  Temp: 98.5 °F (36.9 °C) (05/15/25 0715)  Pulse: 80 (05/15/25 1100)  Resp: 20 (05/15/25 1100)  BP: 125/80 (05/15/25 1100)  SpO2: 97 % (05/15/25 1100) Vital Signs (24h Range):  Temp:  [98.1 °F (36.7 °C)-98.5 °F (36.9 °C)] 98.5 °F (36.9 °C)  Pulse:  [] 80  Resp:  [13-37] 20  SpO2:  [92 %-99 %] 97 %  BP: (121-179)/() 125/80     Weight: 108 kg (238 lb)  Body mass index is 31.4 kg/m².      Intake/Output Summary (Last 24 hours) at 5/15/2025 1254  Last data filed at 5/15/2025 1054  Gross per 24 hour   Intake 1555.23 ml   Output 3150 ml   Net -1594.77 ml        Physical Exam  Vitals and nursing note reviewed.   Constitutional:       General: He is not in acute distress.  HENT:      Head: Normocephalic and atraumatic.   Eyes:      General:         Left eye: No discharge.   Cardiovascular:      Rate and Rhythm: Normal rate and regular rhythm.      Heart sounds: No murmur heard.  Pulmonary:      Effort: No respiratory distress.      Breath sounds: No wheezing.   Abdominal:      General: There is no distension.      Palpations: Abdomen is soft.   Musculoskeletal:         General: No swelling or tenderness.      Cervical back: Neck supple. No rigidity.   Neurological:      General: No focal deficit present.      Mental Status: He is alert and oriented to person, place, and time.           Review of Systems   HENT: Negative.     Respiratory: Negative.     Cardiovascular: Negative.    Gastrointestinal: Negative.    Genitourinary: Negative.    Musculoskeletal: Negative.        Vents:       Lines/Drains/Airways       Peripheral Intravenous Line  Duration                  Peripheral IV - Single Lumen 05/14/25 2045 18 G Right Antecubital <1 day         Peripheral IV - Single Lumen 05/14/25 2055 20 G 1 1/4 in Left Antecubital <1 day                    Significant Labs:    CBC/Anemia Profile:  Recent Labs   Lab 05/14/25 2049  05/15/25  0326   WBC 7.23 7.97   HGB 15.0 14.4   HCT 43.4 40.9    232   MCV 81* 81*   RDW 13.0 12.9        Chemistries:  Recent Labs   Lab 05/14/25  2049 05/15/25  0326   * 137   K 3.7 3.5    106   CO2 20* 19*   BUN 16 13   CREATININE 1.3 0.9   CALCIUM 9.6 9.1   ALBUMIN 4.7  --    PROT 8.3  --    BILITOT 1.1*  --    ALKPHOS 57  --    ALT 33  --    AST 21  --    MG  --  1.9   PHOS  --  3.2       All pertinent labs within the past 24 hours have been reviewed.    Significant Imaging:  I have reviewed all pertinent imaging results/findings within the past 24 hours.

## 2025-05-15 NOTE — ASSESSMENT & PLAN NOTE
Non obstructive CAD on emergent LHC with EF 55%  Believe STEMI is direct result of malignant hypertension; see plan for management above  Continuous ICU cardiac monitoring    5/15 nonobstructive asa/ statin betablocker

## 2025-05-15 NOTE — ASSESSMENT & PLAN NOTE
Patient has a current diagnosis of Hypertensive emergency with end organ damage evidenced by acute coronary syndrome which is uncontrolled.    He has been taking over the counter cold/congestion med (dayquil) which likely contributed to BP elevation. We discussed risks along with safe cough/congestion medication for future.    Latest blood pressure and vitals reviewed-   Temp:  [98.2 °F (36.8 °C)-98.4 °F (36.9 °C)]   Pulse:  []   Resp:  [16-21]   BP: (146-179)/()   SpO2:  [95 %-99 %] .     Home meds for hypertension were reviewed currently taking losartan daily     Patient currently on IV antihypertensives - Cardene infusion, titrate for goal SBP < 140  Medication adjustment for hospital antihypertensives is as follows- Discussed with Cardiology will continue cardene drip, initiate coreg, Plan to adjust further in am pending response to coreg.    Will aim for controlled BP reduction by medications noted above. Monitor and mitigate end organ damage as indicated.

## 2025-05-16 ENCOUNTER — TELEPHONE (OUTPATIENT)
Dept: CARDIOLOGY | Facility: CLINIC | Age: 40
End: 2025-05-16
Payer: COMMERCIAL

## 2025-05-16 VITALS
RESPIRATION RATE: 20 BRPM | TEMPERATURE: 98 F | HEART RATE: 65 BPM | SYSTOLIC BLOOD PRESSURE: 144 MMHG | OXYGEN SATURATION: 96 % | HEIGHT: 73 IN | DIASTOLIC BLOOD PRESSURE: 90 MMHG | WEIGHT: 238 LBS | BODY MASS INDEX: 31.54 KG/M2

## 2025-05-16 LAB
ABSOLUTE EOSINOPHIL (OHS): 0.15 K/UL
ABSOLUTE MONOCYTE (OHS): 0.73 K/UL (ref 0.3–1)
ABSOLUTE NEUTROPHIL COUNT (OHS): 2.21 K/UL (ref 1.8–7.7)
ANION GAP (OHS): 11 MMOL/L (ref 8–16)
BASOPHILS # BLD AUTO: 0.03 K/UL
BASOPHILS NFR BLD AUTO: 0.7 %
BUN SERPL-MCNC: 14 MG/DL (ref 6–20)
CALCIUM SERPL-MCNC: 9.5 MG/DL (ref 8.7–10.5)
CHLORIDE SERPL-SCNC: 104 MMOL/L (ref 95–110)
CO2 SERPL-SCNC: 22 MMOL/L (ref 23–29)
CREAT SERPL-MCNC: 0.9 MG/DL (ref 0.5–1.4)
ERYTHROCYTE [DISTWIDTH] IN BLOOD BY AUTOMATED COUNT: 13 % (ref 11.5–14.5)
GFR SERPLBLD CREATININE-BSD FMLA CKD-EPI: >60 ML/MIN/1.73/M2
GLUCOSE SERPL-MCNC: 106 MG/DL (ref 70–110)
HCT VFR BLD AUTO: 40.6 % (ref 40–54)
HGB BLD-MCNC: 14 GM/DL (ref 14–18)
IMM GRANULOCYTES # BLD AUTO: 0.02 K/UL (ref 0–0.04)
IMM GRANULOCYTES NFR BLD AUTO: 0.4 % (ref 0–0.5)
LYMPHOCYTES # BLD AUTO: 1.34 K/UL (ref 1–4.8)
MAGNESIUM SERPL-MCNC: 1.8 MG/DL (ref 1.6–2.6)
MCH RBC QN AUTO: 28.3 PG (ref 27–31)
MCHC RBC AUTO-ENTMCNC: 34.5 G/DL (ref 32–36)
MCV RBC AUTO: 82 FL (ref 82–98)
NUCLEATED RBC (/100WBC) (OHS): 0 /100 WBC
OHS QRS DURATION: 86 MS
OHS QTC CALCULATION: 415 MS
PHOSPHATE SERPL-MCNC: 3.6 MG/DL (ref 2.7–4.5)
PLATELET # BLD AUTO: 236 K/UL (ref 150–450)
PMV BLD AUTO: 10.3 FL (ref 9.2–12.9)
POTASSIUM SERPL-SCNC: 3.6 MMOL/L (ref 3.5–5.1)
RBC # BLD AUTO: 4.94 M/UL (ref 4.6–6.2)
RELATIVE EOSINOPHIL (OHS): 3.3 %
RELATIVE LYMPHOCYTE (OHS): 29.9 % (ref 18–48)
RELATIVE MONOCYTE (OHS): 16.3 % (ref 4–15)
RELATIVE NEUTROPHIL (OHS): 49.4 % (ref 38–73)
SODIUM SERPL-SCNC: 137 MMOL/L (ref 136–145)
WBC # BLD AUTO: 4.48 K/UL (ref 3.9–12.7)

## 2025-05-16 PROCEDURE — 25000003 PHARM REV CODE 250: Performed by: PHYSICIAN ASSISTANT

## 2025-05-16 PROCEDURE — 25000003 PHARM REV CODE 250: Performed by: INTERNAL MEDICINE

## 2025-05-16 PROCEDURE — 84100 ASSAY OF PHOSPHORUS: CPT | Performed by: NURSE PRACTITIONER

## 2025-05-16 PROCEDURE — 80048 BASIC METABOLIC PNL TOTAL CA: CPT | Performed by: NURSE PRACTITIONER

## 2025-05-16 PROCEDURE — 83735 ASSAY OF MAGNESIUM: CPT | Performed by: NURSE PRACTITIONER

## 2025-05-16 PROCEDURE — 99238 HOSP IP/OBS DSCHRG MGMT 30/<: CPT | Mod: ,,, | Performed by: PHYSICIAN ASSISTANT

## 2025-05-16 PROCEDURE — 36415 COLL VENOUS BLD VENIPUNCTURE: CPT | Performed by: NURSE PRACTITIONER

## 2025-05-16 PROCEDURE — 85025 COMPLETE CBC W/AUTO DIFF WBC: CPT | Performed by: NURSE PRACTITIONER

## 2025-05-16 RX ORDER — ATORVASTATIN CALCIUM 20 MG/1
20 TABLET, FILM COATED ORAL NIGHTLY
Qty: 90 TABLET | Refills: 3 | Status: SHIPPED | OUTPATIENT
Start: 2025-05-16 | End: 2026-05-16

## 2025-05-16 RX ORDER — CARVEDILOL 25 MG/1
25 TABLET ORAL 2 TIMES DAILY
Qty: 180 TABLET | Refills: 3 | Status: SHIPPED | OUTPATIENT
Start: 2025-05-16 | End: 2026-05-16

## 2025-05-16 RX ORDER — ASPIRIN 81 MG/1
81 TABLET ORAL DAILY
Qty: 90 TABLET | Refills: 3 | Status: SHIPPED | OUTPATIENT
Start: 2025-05-17 | End: 2026-05-17

## 2025-05-16 RX ORDER — LOSARTAN POTASSIUM 100 MG/1
100 TABLET ORAL DAILY
Qty: 90 TABLET | Refills: 3 | Status: SHIPPED | OUTPATIENT
Start: 2025-05-17 | End: 2026-05-17

## 2025-05-16 RX ORDER — POLYETHYLENE GLYCOL 3350 17 G/17G
17 POWDER, FOR SOLUTION ORAL DAILY PRN
Status: DISCONTINUED | OUTPATIENT
Start: 2025-05-16 | End: 2025-05-16 | Stop reason: HOSPADM

## 2025-05-16 RX ORDER — LOSARTAN POTASSIUM 50 MG/1
100 TABLET ORAL DAILY
Status: DISCONTINUED | OUTPATIENT
Start: 2025-05-16 | End: 2025-05-16 | Stop reason: HOSPADM

## 2025-05-16 RX ADMIN — FLUTICASONE PROPIONATE 100 MCG: 50 SPRAY, METERED NASAL at 09:05

## 2025-05-16 RX ADMIN — POLYETHYLENE GLYCOL 3350 17 G: 17 POWDER, FOR SOLUTION ORAL at 09:05

## 2025-05-16 RX ADMIN — ACETAMINOPHEN 650 MG: 325 TABLET ORAL at 09:05

## 2025-05-16 RX ADMIN — ASPIRIN 81 MG: 81 TABLET, COATED ORAL at 09:05

## 2025-05-16 RX ADMIN — LOSARTAN POTASSIUM 100 MG: 50 TABLET, FILM COATED ORAL at 09:05

## 2025-05-16 RX ADMIN — CARVEDILOL 25 MG: 12.5 TABLET, FILM COATED ORAL at 09:05

## 2025-05-16 RX ADMIN — ACETAMINOPHEN 650 MG: 325 TABLET ORAL at 04:05

## 2025-05-16 RX ADMIN — PANTOPRAZOLE SODIUM 40 MG: 40 TABLET, DELAYED RELEASE ORAL at 09:05

## 2025-05-16 NOTE — PLAN OF CARE
[facility] - Intensive [facility] (Hospital)  Discharge Final Note    Primary Care Provider: Rachele Peter    Expected Discharge Date: 5/16/2025    Final Discharge Note (most recent)       Final Note - 05/16/25 1304          Final Note    Assessment Type Final Discharge Note     Anticipated Discharge Disposition Home or Self Care     Hospital Resources/Appts/Education Provided Appointments scheduled and added to AVS        Post-Acute Status    Discharge Delays None known at this time                     Important Message from Medicare             Contact Info       Christ Butts MD   Specialty: Interventional Cardiology, Cardiology    [address]  [place] [address]   Phone: [phone]       Next Steps: Schedule an appointment as soon as possible for a visit in 1 week(s)          Hospital Follow Up with Nannette Cabral PA-C scheduled for:  Friday May 30, 2025 4:00 PM    No other CM needs.

## 2025-05-16 NOTE — EICU
Virtual ICU Quality Rounds    Admit Date: 5/14/2025  Hospital Day: 2    ICU Day: 1d 12h    24H Vital Sign Range:  Temp:  [97.4 °F (36.3 °C)-98.9 °F (37.2 °C)]   Pulse:  [59-80]   Resp:  [9-28]   BP: (120-147)/()   SpO2:  [91 %-97 %]     VICU Surveillance Screening  LDA reconciliation : Yes  Nursing orders placed : IP RAJAN Peripheral IV Access

## 2025-05-16 NOTE — TELEPHONE ENCOUNTER
Contacted pt and confirmed he did get all his meds prior to d/c. Informed him to call if he needs any refills and confirmed HFU w/ HDINORAH Mancilla on 05/30/25. Pt vu w/o q/c    ---- DINORAH Ortiz 05/16/25 04:44 PM ----  Also please call and make sure he got all of his meds prior to d/c

## 2025-05-16 NOTE — DISCHARGE SUMMARY
[facility] (Hospital)  Cardiology  Discharge Summary      Patient Name: Jose Bloom  MRN: [id number]  Admission Date: 5/14/2025  Hospital Length of Stay: 2 days  Discharge Date and Time: 05/16/2025 12:35 PM  Attending Physician: No att. providers found    Discharging Provider: Barbra Malin PA-C  Primary Care Physician: [place], Provider    HPI:   41 y/o male with PMHx of HTN and tobacco use who presented to the ED on 5/14/25 with chest pain X3 days with associated SOB, LH and nausea. EKG performed with ST elevation. Noted to be in distress and taken to cath lab for LHC. Heart cath revealed non obstructive CAD.     Pt seen and examined today. Resting in bed with no acute distress. No acute events overnight. Adjustments made to medicines. Discussed medication compliance and smoking cessation. Discharge instructions and restrictions discussed. No cardiac symptoms at this time. He has ambulated with no issues. Labs reviewed. Access site remains stable and CDI.     Procedure(s) (LRB):  Left heart cath (Left)  Ventriculogram, Left  ANGIOGRAM, CORONARY ARTERY (N/A)     Indwelling Lines/Drains at time of discharge:  Lines/Drains/Airways       None                   Hospital Course:  5/15/2025-Patient seen and examined today, s/p LHC which showed non-obs CAD. Feels ok. Reported some mild CP/SOB overnight, improved. Off Cardene drip. Meds adjusted. Labs reviewed.     Goals of Care Treatment Preferences:  Code Status: Full Code      Consults:   Consults (From admission, onward)          Status Ordering Provider     Inpatient consult to Critical Care Medicine  Once        Provider:  Johnny Chapman MD    Completed VALORIE MCNEILL            Significant Diagnostic Studies: Labs: BMP:   Recent Labs   Lab 05/14/25  2049 05/15/25  0326 05/16/25  0331   GLU 92 114* 106   * 137 137   K 3.7 3.5 3.6    106 104   CO2 20* 19* 22*   BUN 16 13 14   CREATININE 1.3 0.9 0.9   CALCIUM 9.6 9.1  9.5   MG  --  1.9 1.8   , CMP   Recent Labs   Lab 05/14/25  2049 05/15/25  0326 05/16/25  0331   * 137 137   K 3.7 3.5 3.6    106 104   CO2 20* 19* 22*   GLU 92 114* 106   BUN 16 13 14   CREATININE 1.3 0.9 0.9   CALCIUM 9.6 9.1 9.5   PROT 8.3  --   --    ALBUMIN 4.7  --   --    BILITOT 1.1*  --   --    ALKPHOS 57  --   --    AST 21  --   --    ALT 33  --   --    ANIONGAP 12 12 11   , CBC   Recent Labs   Lab 05/14/25  2049 05/15/25  0326 05/16/25  0331   WBC 7.23 7.97 4.48   HGB 15.0 14.4 14.0   HCT 43.4 40.9 40.6    232 236   , Lipid Panel   Lab Results   Component Value Date    CHOL 174 05/15/2025    HDL 37 (L) 05/15/2025    LDLCALC 110.8 05/15/2025    TRIG 131 05/15/2025    CHOLHDL 21.3 05/15/2025   , Troponin   Recent Labs   Lab 05/14/25  2049 05/15/25  0326   TROPONINI <0.006 0.016   , and All labs within the past 24 hours have been reviewed  Angiography: reviewed     Pending Diagnostic Studies:       None            Final Active Diagnoses:    Diagnosis Date Noted POA    Abnormal EKG [R94.31] 05/15/2025 Yes    Tobacco smoker, less than 10 cigarettes per day [F17.210] 05/14/2025 Yes    Sore throat [J02.9] 05/14/2025 Yes    Hypertensive emergency [I16.1] 04/15/2014 Yes      Problems Resolved During this Admission:    Diagnosis Date Noted Date Resolved POA    PRINCIPAL PROBLEM:  STEMI (ST elevation myocardial infarction) [I21.3] 05/14/2025 05/15/2025 Yes     No new Assessment & Plan notes have been filed under this hospital service since the last note was generated.  Service: Cardiology      Discharged Condition: stable    Disposition: Home or Self Care    Follow Up:   Follow-up Information       Christ Butts MD. Schedule an appointment as soon as possible for a visit in 1 week(s).    Specialties: Interventional Cardiology, Cardiology  Contact information:  [address]  [place] [address]  [phone]                           Patient Instructions:      Diet Cardiac     Remove  dressing in 24 hours     Notify your health care provider if you experience any of the following:  temperature >100.4     Notify your health care provider if you experience any of the following:  persistent nausea and vomiting or diarrhea     Notify your health care provider if you experience any of the following:     Notify your health care provider if you experience any of the following:  increased confusion or weakness     Notify your health care provider if you experience any of the following:  persistent dizziness, light-headedness, or visual disturbances     Notify your health care provider if you experience any of the following:  worsening rash     Notify your health care provider if you experience any of the following:  severe persistent headache     Notify your health care provider if you experience any of the following:  difficulty breathing or increased cough     Notify your health care provider if you experience any of the following:  redness, tenderness, or signs of infection (pain, swelling, redness, odor or green/yellow discharge around incision site)     Notify your health care provider if you experience any of the following:  severe uncontrolled pain     Lifting restrictions   Order Comments: No lifting anything heavy above 10 pounds for next 7 days     Other restrictions (specify):   Order Comments: No soaking site in water     Shower on day dressing removed (No bath)     Medications:  Reconciled Home Medications:      Medication List        START taking these medications      aspirin 81 MG EC tablet  Commonly known as: ECOTRIN  Take 1 tablet (81 mg total) by mouth once daily.  Start taking on: May 17, 2025     atorvastatin 20 MG tablet  Commonly known as: LIPITOR  Take 1 tablet (20 mg total) by mouth every evening.     losartan 100 MG tablet  Commonly known as: COZAAR  Take 1 tablet (100 mg total) by mouth once daily.  Start taking on: May 17, 2025            CHANGE how you take these medications       carvediloL 25 MG tablet  Commonly known as: COREG  Take 1 tablet (25 mg total) by mouth 2 (two) times daily.  What changed:   medication strength  how much to take  when to take this            STOP taking these medications      lisinopriL-hydrochlorothiazide 20-12.5 mg per tablet  Commonly known as: PRINZIDE,ZESTORETIC     meloxicam 7.5 MG tablet  Commonly known as: MOBIC              Time spent on the discharge of patient: 25 minutes    Barbra Malin PA-C  Cardiology  [facility] ([facility])

## 2025-05-18 ENCOUNTER — NURSE TRIAGE (OUTPATIENT)
Dept: ADMINISTRATIVE | Facility: CLINIC | Age: 40
End: 2025-05-18
Payer: COMMERCIAL

## 2025-05-18 ENCOUNTER — HOSPITAL ENCOUNTER (OUTPATIENT)
Facility: HOSPITAL | Age: 40
Discharge: HOME | End: 2025-05-19
Attending: EMERGENCY MEDICINE | Admitting: HOSPITALIST
Payer: COMMERCIAL

## 2025-05-18 DIAGNOSIS — R10.9 RIGHT FLANK PAIN: Primary | ICD-10-CM

## 2025-05-18 DIAGNOSIS — R52 INTRACTABLE PAIN: ICD-10-CM

## 2025-05-18 DIAGNOSIS — R07.9 CHEST PAIN: ICD-10-CM

## 2025-05-18 DIAGNOSIS — R31.9 HEMATURIA, UNSPECIFIED TYPE: ICD-10-CM

## 2025-05-18 DIAGNOSIS — I16.0 HYPERTENSIVE URGENCY: ICD-10-CM

## 2025-05-18 DIAGNOSIS — R10.31 RIGHT GROIN PAIN: ICD-10-CM

## 2025-05-18 DIAGNOSIS — I10 HYPERTENSION: ICD-10-CM

## 2025-05-18 DIAGNOSIS — N17.9 AKI (ACUTE KIDNEY INJURY): ICD-10-CM

## 2025-05-18 LAB
ABSOLUTE EOSINOPHIL (OHS): 0.28 K/UL
ABSOLUTE MONOCYTE (OHS): 0.91 K/UL (ref 0.3–1)
ABSOLUTE NEUTROPHIL COUNT (OHS): 3.53 K/UL (ref 1.8–7.7)
ALBUMIN SERPL BCP-MCNC: 4.3 G/DL (ref 3.5–5.2)
ALP SERPL-CCNC: 61 UNIT/L (ref 40–150)
ALT SERPL W/O P-5'-P-CCNC: 42 UNIT/L (ref 10–44)
ANION GAP (OHS): 11 MMOL/L (ref 8–16)
AST SERPL-CCNC: 25 UNIT/L (ref 11–45)
BACTERIA #/AREA URNS AUTO: ABNORMAL /HPF
BASOPHILS # BLD AUTO: 0.03 K/UL
BASOPHILS NFR BLD AUTO: 0.5 %
BILIRUB SERPL-MCNC: 0.5 MG/DL (ref 0.1–1)
BILIRUB UR QL STRIP.AUTO: NEGATIVE
BUN SERPL-MCNC: 16 MG/DL (ref 6–20)
CALCIUM SERPL-MCNC: 9.7 MG/DL (ref 8.7–10.5)
CHLORIDE SERPL-SCNC: 105 MMOL/L (ref 95–110)
CK SERPL-CCNC: 164 U/L (ref 20–200)
CLARITY UR: CLEAR
CO2 SERPL-SCNC: 22 MMOL/L (ref 23–29)
COLOR UR AUTO: COLORLESS
CREAT SERPL-MCNC: 1.6 MG/DL (ref 0.5–1.4)
ERYTHROCYTE [DISTWIDTH] IN BLOOD BY AUTOMATED COUNT: 12.6 % (ref 11.5–14.5)
GFR SERPLBLD CREATININE-BSD FMLA CKD-EPI: 56 ML/MIN/1.73/M2
GLUCOSE SERPL-MCNC: 96 MG/DL (ref 70–110)
GLUCOSE UR QL STRIP: NEGATIVE
HCT VFR BLD AUTO: 40.1 % (ref 40–54)
HGB BLD-MCNC: 13.9 GM/DL (ref 14–18)
HGB UR QL STRIP: ABNORMAL
HOLD SPECIMEN: NORMAL
HYALINE CASTS UR QL AUTO: 0 /LPF (ref 0–1)
IMM GRANULOCYTES # BLD AUTO: 0.02 K/UL (ref 0–0.04)
IMM GRANULOCYTES NFR BLD AUTO: 0.3 % (ref 0–0.5)
KETONES UR QL STRIP: NEGATIVE
LEUKOCYTE ESTERASE UR QL STRIP: NEGATIVE
LIPASE SERPL-CCNC: 48 U/L (ref 4–60)
LYMPHOCYTES # BLD AUTO: 1.3 K/UL (ref 1–4.8)
MCH RBC QN AUTO: 28.4 PG (ref 27–31)
MCHC RBC AUTO-ENTMCNC: 34.7 G/DL (ref 32–36)
MCV RBC AUTO: 82 FL (ref 82–98)
MICROSCOPIC COMMENT: ABNORMAL
NITRITE UR QL STRIP: NEGATIVE
NUCLEATED RBC (/100WBC) (OHS): 0 /100 WBC
PH UR STRIP: 6 [PH]
PLATELET # BLD AUTO: 253 K/UL (ref 150–450)
PMV BLD AUTO: 10.4 FL (ref 9.2–12.9)
POTASSIUM SERPL-SCNC: 3.6 MMOL/L (ref 3.5–5.1)
PROT SERPL-MCNC: 7.7 GM/DL (ref 6–8.4)
PROT UR QL STRIP: ABNORMAL
RBC # BLD AUTO: 4.89 M/UL (ref 4.6–6.2)
RBC #/AREA URNS AUTO: 55 /HPF (ref 0–4)
RELATIVE EOSINOPHIL (OHS): 4.6 %
RELATIVE LYMPHOCYTE (OHS): 21.4 % (ref 18–48)
RELATIVE MONOCYTE (OHS): 15 % (ref 4–15)
RELATIVE NEUTROPHIL (OHS): 58.2 % (ref 38–73)
SODIUM SERPL-SCNC: 138 MMOL/L (ref 136–145)
SP GR UR STRIP: 1
UROBILINOGEN UR STRIP-ACNC: NEGATIVE EU/DL
WBC # BLD AUTO: 6.07 K/UL (ref 3.9–12.7)
WBC #/AREA URNS AUTO: 6 /HPF (ref 0–5)

## 2025-05-18 PROCEDURE — 93010 ELECTROCARDIOGRAM REPORT: CPT | Mod: ,,, | Performed by: INTERNAL MEDICINE

## 2025-05-18 PROCEDURE — 25000003 PHARM REV CODE 250: Performed by: EMERGENCY MEDICINE

## 2025-05-18 PROCEDURE — 83690 ASSAY OF LIPASE: CPT | Performed by: EMERGENCY MEDICINE

## 2025-05-18 PROCEDURE — 63600175 PHARM REV CODE 636 W HCPCS: Performed by: EMERGENCY MEDICINE

## 2025-05-18 PROCEDURE — 82550 ASSAY OF CK (CPK): CPT | Performed by: EMERGENCY MEDICINE

## 2025-05-18 PROCEDURE — 93005 ELECTROCARDIOGRAM TRACING: CPT

## 2025-05-18 PROCEDURE — 80307 DRUG TEST PRSMV CHEM ANLYZR: CPT | Performed by: EMERGENCY MEDICINE

## 2025-05-18 PROCEDURE — 82310 ASSAY OF CALCIUM: CPT | Performed by: EMERGENCY MEDICINE

## 2025-05-18 PROCEDURE — 85025 COMPLETE CBC W/AUTO DIFF WBC: CPT | Performed by: EMERGENCY MEDICINE

## 2025-05-18 PROCEDURE — 81001 URINALYSIS AUTO W/SCOPE: CPT | Mod: XB | Performed by: EMERGENCY MEDICINE

## 2025-05-18 RX ORDER — HYDROMORPHONE HYDROCHLORIDE 1 MG/ML
1 INJECTION, SOLUTION INTRAMUSCULAR; INTRAVENOUS; SUBCUTANEOUS
Refills: 0 | Status: COMPLETED | OUTPATIENT
Start: 2025-05-18 | End: 2025-05-18

## 2025-05-18 RX ORDER — HYDRALAZINE HYDROCHLORIDE 20 MG/ML
10 INJECTION INTRAMUSCULAR; INTRAVENOUS
Status: COMPLETED | OUTPATIENT
Start: 2025-05-18 | End: 2025-05-18

## 2025-05-18 RX ORDER — ONDANSETRON HYDROCHLORIDE 2 MG/ML
4 INJECTION, SOLUTION INTRAVENOUS
Status: COMPLETED | OUTPATIENT
Start: 2025-05-18 | End: 2025-05-18

## 2025-05-18 RX ORDER — HYDROMORPHONE HYDROCHLORIDE 1 MG/ML
0.5 INJECTION, SOLUTION INTRAMUSCULAR; INTRAVENOUS; SUBCUTANEOUS
Refills: 0 | Status: COMPLETED | OUTPATIENT
Start: 2025-05-18 | End: 2025-05-18

## 2025-05-18 RX ORDER — HYDROMORPHONE HYDROCHLORIDE 1 MG/ML
0.5 INJECTION, SOLUTION INTRAMUSCULAR; INTRAVENOUS; SUBCUTANEOUS ONCE
Status: COMPLETED | OUTPATIENT
Start: 2025-05-18 | End: 2025-05-18

## 2025-05-18 RX ORDER — MORPHINE SULFATE 4 MG/ML
4 INJECTION, SOLUTION INTRAMUSCULAR; INTRAVENOUS
Refills: 0 | Status: COMPLETED | OUTPATIENT
Start: 2025-05-18 | End: 2025-05-18

## 2025-05-18 RX ADMIN — HYDROMORPHONE HYDROCHLORIDE 0.5 MG: 1 INJECTION, SOLUTION INTRAMUSCULAR; INTRAVENOUS; SUBCUTANEOUS at 11:05

## 2025-05-18 RX ADMIN — HYDRALAZINE HYDROCHLORIDE 10 MG: 20 INJECTION INTRAMUSCULAR; INTRAVENOUS at 06:05

## 2025-05-18 RX ADMIN — SODIUM CHLORIDE 1000 ML: 9 INJECTION, SOLUTION INTRAVENOUS at 08:05

## 2025-05-18 RX ADMIN — ONDANSETRON 4 MG: 2 INJECTION INTRAMUSCULAR; INTRAVENOUS at 06:05

## 2025-05-18 RX ADMIN — SODIUM CHLORIDE 1000 ML: 9 INJECTION, SOLUTION INTRAVENOUS at 07:05

## 2025-05-18 RX ADMIN — MORPHINE SULFATE 4 MG: 4 INJECTION INTRAVENOUS at 06:05

## 2025-05-18 RX ADMIN — SODIUM CHLORIDE 1000 ML: 9 INJECTION, SOLUTION INTRAVENOUS at 11:05

## 2025-05-18 RX ADMIN — HYDROMORPHONE HYDROCHLORIDE 1 MG: 1 INJECTION, SOLUTION INTRAMUSCULAR; INTRAVENOUS; SUBCUTANEOUS at 08:05

## 2025-05-18 NOTE — TELEPHONE ENCOUNTER
Pt states that he is having some bad lower back pain. Pt also states that his latest BP's are 153/93 and 152//90's.10/10 on a pain scale. Pt just got out of the hospital Friday. Pt is also having abdominal pain. Pt is heard moaning in pain during the triage call. Pt also states that he has been taking his medications for High blood pressure as prescribed. Dispo- Go to ED/UCC now or PCP triage. Pt will go to the ED. Pt will have another adult drive him. Advised to call back for any further concerns or questions.             Reason for Disposition   [1] SEVERE abdominal pain AND [2] present > 1 hour    Additional Information   Negative: Passed out (e.g., fainted, lost consciousness, blacked out and was not responding)   Negative: Shock suspected (e.g., cold/pale/clammy skin, too weak to stand, low BP, rapid pulse)   Negative: Sounds like a life-threatening emergency to the triager   Negative: [1] SEVERE back pain (e.g., excruciating) AND [2] sudden onset AND [3] age > 60 years   Negative: [1] Unable to urinate (or only a few drops) > 4 hours AND [2] bladder feels very full (e.g., palpable bladder or strong urge to urinate)   Negative: [1] Loss of bladder or bowel control (urine or bowel incontinence; wetting self, leaking stool) AND [2] new-onset   Negative: Numbness in groin or rectal area (i.e., loss of sensation)    Protocols used: Back Pain-A-

## 2025-05-18 NOTE — Clinical Note
"Jose Bloom ([place]) was seen and treated in our emergency department on 5/18/2025.  He may return to work on 05/21/2025.       If you have any questions or concerns, please don't hesitate to call.      Manuel PRATER    "

## 2025-05-18 NOTE — ED PROVIDER NOTES
SCRIBE #1 NOTE: I, Jeri Arnold, am scribing for, and in the presence of, David Melissa MD. I have scribed the entire note.       History     Chief Complaint   Patient presents with    Hypertension     Pt with recent history of MI and heart cath c/o elevated BP.  Pt also c/o bilateral flank pain and abdominal pain.     Review of patient's allergies indicates:  No Known Allergies      History of Present Illness     HPI    5/18/2025, 6:15 PM  History obtained from the patient and medical records      History of Present Illness: Jose Bloom is a 40 y.o. male patient with a PMHx of hypertension and a heart murmur who presents to the Emergency Department for evaluation of acute lower back pain which began today. Pt was evaluated in the ED on 05/14/25 where he was diagnosed with a STEMI and was admitted to the ICU. A left heart cath was preformed by Dr. Butts on 05/14/25. Pt contacted his nurse today, Ameena Coker RN, who advised he is assessed in the ED. Pt reports blood-pressure readings of 150's/90's at home. Pt states the back pain radiates to the abdomen but not down the legs. Pt is unsure if he urinated today but reports 2 bowel movements. Patient denies any diarrhea, chest pain, or constipation. No prior Tx specified.  Pt reports compliance with all new medication changes. No further complaints or concerns at this time.       Arrival mode: Personal Transportation    PCP: Rachele Peter        Past Medical History:  Past Medical History:   Diagnosis Date    Hypertension     Murmur, cardiac        Past Surgical History:  Past Surgical History:   Procedure Laterality Date    CORONARY ANGIOGRAPHY N/A 5/14/2025    Procedure: ANGIOGRAM, CORONARY ARTERY;  Surgeon: Christ Butts MD;  Location: [facility] CATH LAB;  Service: Cardiology;  Laterality: N/A;    LEFT HEART CATHETERIZATION Left 5/14/2025    Procedure: Left heart cath;  Surgeon: Christ Butts MD;  Location: [facility] CATH LAB;  Service: Cardiology;   Laterality: Left;    VENTRICULOGRAM, LEFT  5/14/2025    Procedure: Ventriculogram, Left;  Surgeon: Christ Butts MD;  Location: [facility] [place] CATH LAB;  Service: Cardiology;;         Family History:  Family History   Problem Relation Name Age of Onset    Hypertension Mother      Hypertension Father         Social History:  Social History     Tobacco Use    Smoking status: Some Days     Current packs/day: 0.10     Types: Cigarettes    Smokeless tobacco: Never   Substance and Sexual Activity    Alcohol use: Yes     Comment: depends on the week, at most a couple drinks after work; this past week that has been daily    Drug use: No    Sexual activity: Not on file        Review of Systems     Review of Systems   Constitutional:  Negative for fever.   HENT:  Negative for sore throat.    Respiratory:  Negative for shortness of breath.    Cardiovascular:  Negative for chest pain.   Gastrointestinal:  Positive for abdominal pain. Negative for constipation, diarrhea and nausea.   Genitourinary:  Negative for dysuria.   Musculoskeletal:  Positive for back pain. Negative for myalgias (BLE).   Skin:  Negative for rash.   Neurological:  Negative for weakness.   Hematological:  Does not bruise/bleed easily.   All other systems reviewed and are negative.     Physical Exam     Initial Vitals [05/18/25 1724]   BP Pulse Resp Temp SpO2   (!) 180/112 78 16 99.6 °F (37.6 °C) 97 %      MAP       --          Physical Exam  Nursing Notes and Vital Signs Reviewed.  Constitutional: Patient is in no acute distress. Well-developed and well-nourished.  Head: Atraumatic. Normocephalic.  Eyes: PERRL. EOM intact. Conjunctivae are not pale. No scleral icterus.  ENT: Mucous membranes are moist.    Neck: Supple. Full ROM.  Cardiovascular: Regular rate. Regular rhythm. Distal pulses are 2+ and symmetric.  Pulmonary/Chest: No respiratory distress. Clear to auscultation bilaterally. No wheezing or rales.  Abdominal: Soft and non-distended.  There is no  "tenderness.  No rebound, guarding, or rigidity.  Genitourinary: No CVA tenderness.  Musculoskeletal: Moves all extremities. No obvious deformities. No edema. Bilateral lower back tenderness.  Skin: Warm and dry.  Neurological:  Alert, awake, and appropriate.  Normal speech.  No acute focal neurological deficits are appreciated.  Psychiatric: Normal affect. Good eye contact. Appropriate in content.     ED Course   Procedures  ED Vital Signs:  Vitals:    05/18/25 1724 05/18/25 1800 05/18/25 1815 05/18/25 1845   BP: (!) 180/112  (!) 158/101 (!) 163/110   Pulse: 78  74 72   Resp: 16 18 20 18   Temp: 99.6 °F (37.6 °C)      TempSrc: Oral      SpO2: 97%  97% 97%   Weight: 103.1 kg (227 lb 6.4 oz)      Height:        05/18/25 1849 05/18/25 1855 05/18/25 1945 05/18/25 2024   BP: (!) 155/103 (!) 155/103 (!) 165/100    Pulse:  65 71    Resp:  20 20 20   Temp:   99.3 °F (37.4 °C)    TempSrc:   Oral    SpO2:  97% 96%    Weight:       Height:        05/18/25 2100 05/18/25 2215 05/18/25 2230 05/18/25 2300   BP: (!) 186/97 (!) 148/92 (!) 156/93    Pulse: 79 71 66    Resp: 20 20 20 20   Temp: 99.3 °F (37.4 °C)      TempSrc: Oral      SpO2: 96% 96% 96%    Weight:       Height:        05/18/25 2305 05/18/25 2344 05/19/25 0000   BP: (S) (!) 176/106  (!) 166/97   Pulse: 68  65   Resp: 20  19   Temp: 99.3 °F (37.4 °C)  99.1 °F (37.3 °C)   TempSrc: Oral  Oral   SpO2: 97%  95%   Weight:      Height:  6' 1" (1.854 m)        Abnormal Lab Results:  Labs Reviewed   COMPREHENSIVE METABOLIC PANEL - Abnormal       Result Value    Sodium 138      Potassium 3.6      Chloride 105      CO2 22 (*)     Glucose 96      BUN 16      Creatinine 1.6 (*)     Calcium 9.7      Protein Total 7.7      Albumin 4.3      Bilirubin Total 0.5      ALP 61      AST 25      ALT 42      Anion Gap 11      eGFR 56 (*)    URINALYSIS, REFLEX TO URINE CULTURE - Abnormal    Color, UA Colorless (*)     Appearance, UA Clear      pH, UA 6.0      Spec Grav UA 1.005      Protein, " "UA 1+ (*)     Glucose, UA Negative      Ketones, UA Negative      Bilirubin, UA Negative      Blood, UA 3+ (*)     Nitrites, UA Negative      Urobilinogen, UA Negative      Leukocyte Esterase, UA Negative     CBC WITH DIFFERENTIAL - Abnormal    WBC 6.07      RBC 4.89      HGB 13.9 (*)     HCT 40.1      MCV 82      MCH 28.4      MCHC 34.7      RDW 12.6      Platelet Count 253      MPV 10.4      Nucleated RBC 0      Neut % 58.2      Lymph % 21.4      Mono % 15.0      Eos % 4.6      Basophil % 0.5      Imm Grans % 0.3      Neut # 3.53      Lymph # 1.30      Mono # 0.91      Eos # 0.28      Baso # 0.03      Imm Grans # 0.02     URINALYSIS MICROSCOPIC - Abnormal    RBC, UA 55 (*)     WBC, UA 6 (*)     Bacteria, UA Occasional      Hyaline Casts, UA 0      Microscopic Comment       DRUG SCREEN PANEL, URINE EMERGENCY - Abnormal    Benzodiazepine, Urine Negative      Methadone, Urine Negative      Cocaine, Urine Negative      Opiates, Urine Presumptive Positive (*)     Barbiturates, Urine Negative      Amphetamines, Urine Negative      THC Presumptive Positive (*)     Phencyclidine, Urine Negative      Urine Creatinine 61.7      Narrative:     This screen includes the following classes of drugs at the listed cut-off:     Benzodiazepines:        200 ng/ml   Methadone:              300 ng/ml   Cocaine metabolite:     300 ng/ml   Opiates:                300 ng/ml   Barbiturates:           200 ng/ml   Amphetamines:           1000 ng/ml   Marijuana metabs (THC): 50 ng/ml   Phencyclidine (PCP):    25 ng/ml     This is a screening test. If results do not correlate with clinical presentation, then a confirmatory send out test is advised.    This report is intended for use in clinical monitoring and management of patients. It is not intended for use in employment related drug testing."   LIPASE - Normal    Lipase Level 48     CK - Normal         CBC W/ AUTO DIFFERENTIAL    Narrative:     The following orders were created for " panel order CBC auto differential.  Procedure                               Abnormality         Status                     ---------                               -----------         ------                     CBC with Differential[[id number]]       Abnormal            Final result                 Please view results for these tests on the individual orders.   GREY TOP URINE HOLD    Extra Tube Hold for add-ons.          All Lab Results:  Results for orders placed or performed during the hospital encounter of 05/18/25   EKG 12-lead    Collection Time: 05/18/25  5:26 PM   Result Value Ref Range    QRS Duration 86 ms    [facility] QTC Calculation 405 ms   Comprehensive metabolic panel    Collection Time: 05/18/25  6:43 PM   Result Value Ref Range    Sodium 138 136 - 145 mmol/L    Potassium 3.6 3.5 - 5.1 mmol/L    Chloride 105 95 - 110 mmol/L    CO2 22 (L) 23 - 29 mmol/L    Glucose 96 70 - 110 mg/dL    BUN 16 6 - 20 mg/dL    Creatinine 1.6 (H) 0.5 - 1.4 mg/dL    Calcium 9.7 8.7 - 10.5 mg/dL    Protein Total 7.7 6.0 - 8.4 gm/dL    Albumin 4.3 3.5 - 5.2 g/dL    Bilirubin Total 0.5 0.1 - 1.0 mg/dL    ALP 61 40 - 150 unit/L    AST 25 11 - 45 unit/L    ALT 42 10 - 44 unit/L    Anion Gap 11 8 - 16 mmol/L    eGFR 56 (L) >60 mL/min/1.73/m2   Lipase    Collection Time: 05/18/25  6:43 PM   Result Value Ref Range    Lipase Level 48 4 - 60 U/L   CBC with Differential    Collection Time: 05/18/25  6:43 PM   Result Value Ref Range    WBC 6.07 3.90 - 12.70 K/uL    RBC 4.89 4.60 - 6.20 M/uL    HGB 13.9 (L) 14.0 - 18.0 gm/dL    HCT 40.1 40.0 - 54.0 %    MCV 82 82 - 98 fL    MCH 28.4 27.0 - 31.0 pg    MCHC 34.7 32.0 - 36.0 g/dL    RDW 12.6 11.5 - 14.5 %    Platelet Count 253 150 - 450 K/uL    MPV 10.4 9.2 - 12.9 fL    Nucleated RBC 0 <=0 /100 WBC    Neut % 58.2 38 - 73 %    Lymph % 21.4 18 - 48 %    Mono % 15.0 4 - 15 %    Eos % 4.6 <=8 %    Basophil % 0.5 <=1.9 %    Imm Grans % 0.3 0.0 - 0.5 %    Neut # 3.53 1.8 - 7.7 K/uL    Lymph # 1.30 1 -  4.8 K/uL    Mono # 0.91 0.3 - 1 K/uL    Eos # 0.28 <=0.5 K/uL    Baso # 0.03 <=0.2 K/uL    Imm Grans # 0.02 0.00 - 0.04 K/uL   CPK    Collection Time: 05/18/25  6:43 PM   Result Value Ref Range     20 - 200 U/L   Urinalysis, Reflex to Urine Culture Urine, Clean Catch    Collection Time: 05/18/25  8:18 PM    Specimen: Urine, Clean Catch   Result Value Ref Range    Color, UA Colorless (A) Straw, Ila, Yellow, Light-Orange    Appearance, UA Clear Clear    pH, UA 6.0 5.0 - 8.0    Spec Grav UA 1.005 1.005 - 1.030    Protein, UA 1+ (A) Negative    Glucose, UA Negative Negative    Ketones, UA Negative Negative    Bilirubin, UA Negative Negative    Blood, UA 3+ (A) Negative    Nitrites, UA Negative Negative    Urobilinogen, UA Negative <2.0 EU/dL    Leukocyte Esterase, UA Negative Negative   GREY TOP URINE HOLD    Collection Time: 05/18/25  8:18 PM   Result Value Ref Range    Extra Tube Hold for add-ons.    Urinalysis Microscopic    Collection Time: 05/18/25  8:18 PM   Result Value Ref Range    RBC, UA 55 (H) 0 - 4 /HPF    WBC, UA 6 (H) 0 - 5 /HPF    Bacteria, UA Occasional None, Rare, Occasional /HPF    Hyaline Casts, UA 0 0 - 1 /LPF    Microscopic Comment     Drug screen panel, emergency    Collection Time: 05/18/25  8:18 PM   Result Value Ref Range    Benzodiazepine, Urine Negative Negative    Methadone, Urine Negative Negative    Cocaine, Urine Negative Negative    Opiates, Urine Presumptive Positive (A) Negative    Barbiturates, Urine Negative Negative    Amphetamines, Urine Negative Negative    THC Presumptive Positive (A) Negative    Phencyclidine, Urine Negative Negative    Urine Creatinine 61.7 23.0 - 375.0 mg/dL       Imaging Results:  Imaging Results              CTA Chest Abdomen Pelvis (XPD) (In process)                      US Soft Tissue, Groin Right (In process)                      US Kidney (In process)                      CT Abdomen Pelvis  Without Contrast (Final result)  Result time 05/18/25  20:29:43      Final result by Curly Olivo MD (05/18/25 20:29:43)                   Impression:      No acute abnormality identified in the abdomen or pelvis.        All CT scans at [this location] are performed using dose modulation techniques as appropriate to a performed exam including the following: automated exposure control; adjustment of the mA and/or kV according to patient size (this includes techniques or standardized protocols for targeted exams where dose is matched to indication / reason for exam; i.e. extremities or head); use of iterative reconstruction technique.    RADIOLOGIST:  JUAN Olivo M.D.    Finalized on: 5/18/2025 8:29 PM By:  JUAN Olivo MD, MD  [facility]# [id number]      2025-05-18 20:31:53.478     [facility]               Narrative:    EXAM: CT ABDOMEN PELVIS WITHOUT CONTRAST    CLINICAL HISTORY:  Flank pain    COMPARISON: None available.    TECHNIQUE: Axial CT imaging through the abdomen and pelvis performed without intravenous contrast are submitted for interpretation. Multiplanar reformats were performed and interpreted.      FINDINGS:    The lung bases are clear.    No hydronephrosis, urolithiasis, or perinephric stranding.    The liver is normal in size and contour.    The spleen and pancreas have a normal non-contrasted appearance.    The adrenal glands are within normal limits.    The gallbladder is unremarkable.    No free intraperitoneal fluid or air.  The bowel is within normal limits.  The appendix is normal. No abdominal lymphadenopathy.    Aorta caliber is normal.        Pelvis:        The urinary bladder is normal.    Moderate degenerative disease at L4-5.                                         The EKG was ordered, reviewed, and independently interpreted by the ED provider.  Interpretation time: 17:26  Rate: 79 BPM  Rhythm: normal sinus rhythm  Interpretation: No acute ST changes. No STEMI.         The Emergency Provider reviewed the vital signs and test results, which are  outlined above.     ED Discussion     11:04 PM: Discussed pt's case with Dr. Juarez (Cardiology) who recommends IV fluids, a urine drug screen, and a CTA. He states his sxs are likely not due to complications from the left heart cath. He also recommends admitting for further imaging and consulting urology.    11:35 PM: Pt reports feeling some discomfort in the right groin area. Right groin has some palpable firmness without ecchymosis.    2:24 AM: Discussed case with Juan A Villalobos MD ([facility] Medicine). Dr. Villalobos agrees with current care and management of pt and accepts admission.   Admitting Service: [facility]  Admitting Physician: Dr. Villalobos  Admit to: Med/Tele    2:24 AM: Re-evaluated pt. I have discussed test results, shared treatment plan, and the need for admission with patient/family/caretaker at bedside. Pt and/or family/caretaker express understanding at this time and agree with all information. All questions answered. Pt/caretaker/family member(s) have no further questions or concerns at this time. Pt is ready for admit.                Medical Decision Making  Amount and/or Complexity of Data Reviewed  External Data Reviewed: notes.     Details: Dr. Romano's ED note on 05/14/2025 reviewed to obtain additional HPI  Dr. Butts's cath lab operation note on 05/14/2025 reviewed to obtain additional HPI  Ameena Charles, RN, message note from today reviewed to obtain additional HPI  Labs: ordered. Decision-making details documented in ED Course.  Radiology: ordered.     Details: Type of Interpretation: Outside Written Report  STAT Radiology Procedure Done:  CT chest and abdominal aorta without and with contrast  Interpretation:  Pericardial effusion. No aortic dissection or aneurysm formation. No intramural hematoma. Bilateral perinephric inflammatory fat stranding suspicious for acute pyelonephritis.  Radiologist:  Terence Hurst MD  05/19/2025  01:02    Type of Interpretation: Outside Written  Report  STAT Radiology Procedure Done:  Ultrasound of right groin  Interpretation: Several morphologically normal lymph nodes. Tubular material which may represent catheter can be seen near the wall or within the lumen of the right common femoral artery. It is measured 6 mm In length. Correlate with instrumentation and procedural history.  Radiologist:  Terence Hurst MD  05/19/2025  01:01  ECG/medicine tests: ordered and independent interpretation performed. Decision-making details documented in ED Course.    Risk  Prescription drug management.  Decision regarding hospitalization.                ED Medication(s):  Medications   morphine injection 4 mg (4 mg Intravenous Given 5/18/25 1845)   ondansetron injection 4 mg (4 mg Intravenous Given 5/18/25 1844)   hydrALAZINE injection 10 mg (10 mg Intravenous Given 5/18/25 1849)   sodium chloride 0.9% bolus 1,000 mL 1,000 mL (0 mLs Intravenous Stopped 5/18/25 2114)   sodium chloride 0.9% bolus 1,000 mL 1,000 mL (0 mLs Intravenous Stopped 5/18/25 2134)   HYDROmorphone injection 1 mg (1 mg Intravenous Given 5/18/25 2024)   HYDROmorphone injection 0.5 mg (0.5 mg Intravenous Given 5/18/25 2300)   sodium chloride 0.9% bolus 1,000 mL 1,000 mL (0 mLs Intravenous Stopped 5/19/25 0005)   HYDROmorphone injection 0.5 mg (0.5 mg Intravenous Given 5/18/25 2305)   iohexoL (OMNIPAQUE 350) injection 100 mL (100 mLs Intravenous Given 5/19/25 0038)       New Prescriptions    No medications on file               Scribe Attestation:   Scribe #1: I performed the above scribed service and the documentation accurately describes the services I performed. I attest to the accuracy of the note.     Attending:   Physician Attestation Statement for Scribe #1: I, David Melissa MD, personally performed the services described in this documentation, as scribed by Jeri Barrett, in my presence, and it is both accurate and complete.           Clinical Impression       ICD-10-CM ICD-9-CM   1. Hypertension   I10 401.9       Disposition:   Disposition: Admitted  Condition: Fair

## 2025-05-19 VITALS
SYSTOLIC BLOOD PRESSURE: 144 MMHG | TEMPERATURE: 98 F | HEART RATE: 59 BPM | WEIGHT: 231.94 LBS | OXYGEN SATURATION: 99 % | DIASTOLIC BLOOD PRESSURE: 92 MMHG | BODY MASS INDEX: 30.74 KG/M2 | HEIGHT: 73 IN | RESPIRATION RATE: 18 BRPM

## 2025-05-19 PROBLEM — E66.09 CLASS 1 OBESITY DUE TO EXCESS CALORIES WITH SERIOUS COMORBIDITY AND BODY MASS INDEX (BMI) OF 30.0 TO 30.9 IN ADULT: Status: ACTIVE | Noted: 2025-05-19

## 2025-05-19 PROBLEM — E66.811 CLASS 1 OBESITY DUE TO EXCESS CALORIES WITH SERIOUS COMORBIDITY AND BODY MASS INDEX (BMI) OF 30.0 TO 30.9 IN ADULT: Status: ACTIVE | Noted: 2025-05-19

## 2025-05-19 PROBLEM — R31.0 GROSS HEMATURIA: Status: ACTIVE | Noted: 2025-05-19

## 2025-05-19 PROBLEM — R10.9 RIGHT FLANK PAIN: Status: ACTIVE | Noted: 2025-05-19

## 2025-05-19 PROBLEM — I10 HYPERTENSION: Status: ACTIVE | Noted: 2025-05-19

## 2025-05-19 PROBLEM — I25.10 CAD (CORONARY ARTERY DISEASE): Status: ACTIVE | Noted: 2025-05-19

## 2025-05-19 PROBLEM — I10 HYPERTENSION: Chronic | Status: ACTIVE | Noted: 2025-05-19

## 2025-05-19 PROBLEM — R10.31 RIGHT GROIN PAIN: Status: ACTIVE | Noted: 2025-05-19

## 2025-05-19 PROBLEM — I25.10 CAD (CORONARY ARTERY DISEASE): Chronic | Status: ACTIVE | Noted: 2025-05-19

## 2025-05-19 LAB
ABSOLUTE EOSINOPHIL (OHS): 0.07 K/UL
ABSOLUTE MONOCYTE (OHS): 0.96 K/UL (ref 0.3–1)
ABSOLUTE NEUTROPHIL COUNT (OHS): 4.33 K/UL (ref 1.8–7.7)
ALBUMIN SERPL BCP-MCNC: 3.6 G/DL (ref 3.5–5.2)
ALP SERPL-CCNC: 47 UNIT/L (ref 40–150)
ALT SERPL W/O P-5'-P-CCNC: 34 UNIT/L (ref 10–44)
AMPHET UR QL SCN: NEGATIVE
ANION GAP (OHS): 10 MMOL/L (ref 8–16)
ANION GAP (OHS): 11 MMOL/L (ref 8–16)
AST SERPL-CCNC: 22 UNIT/L (ref 11–45)
BARBITURATE SCN PRESENT UR: NEGATIVE
BASOPHILS # BLD AUTO: 0.03 K/UL
BASOPHILS NFR BLD AUTO: 0.5 %
BENZODIAZ UR QL SCN: NEGATIVE
BILIRUB SERPL-MCNC: 0.4 MG/DL (ref 0.1–1)
BUN SERPL-MCNC: 14 MG/DL (ref 6–20)
BUN SERPL-MCNC: 17 MG/DL (ref 6–20)
CALCIUM SERPL-MCNC: 8.8 MG/DL (ref 8.7–10.5)
CALCIUM SERPL-MCNC: 9.1 MG/DL (ref 8.7–10.5)
CANNABINOIDS UR QL SCN: ABNORMAL
CHLORIDE SERPL-SCNC: 107 MMOL/L (ref 95–110)
CHLORIDE SERPL-SCNC: 112 MMOL/L (ref 95–110)
CO2 SERPL-SCNC: 21 MMOL/L (ref 23–29)
CO2 SERPL-SCNC: 22 MMOL/L (ref 23–29)
COCAINE UR QL SCN: NEGATIVE
CREAT SERPL-MCNC: 1.4 MG/DL (ref 0.5–1.4)
CREAT SERPL-MCNC: 1.6 MG/DL (ref 0.5–1.4)
CREAT UR-MCNC: 61.7 MG/DL (ref 23–375)
ERYTHROCYTE [DISTWIDTH] IN BLOOD BY AUTOMATED COUNT: 12.9 % (ref 11.5–14.5)
GFR SERPLBLD CREATININE-BSD FMLA CKD-EPI: 56 ML/MIN/1.73/M2
GFR SERPLBLD CREATININE-BSD FMLA CKD-EPI: >60 ML/MIN/1.73/M2
GLUCOSE SERPL-MCNC: 103 MG/DL (ref 70–110)
GLUCOSE SERPL-MCNC: 109 MG/DL (ref 70–110)
HCT VFR BLD AUTO: 41.6 % (ref 40–54)
HGB BLD-MCNC: 14 GM/DL (ref 14–18)
IMM GRANULOCYTES # BLD AUTO: 0.02 K/UL (ref 0–0.04)
IMM GRANULOCYTES NFR BLD AUTO: 0.3 % (ref 0–0.5)
LYMPHOCYTES # BLD AUTO: 1.22 K/UL (ref 1–4.8)
MCH RBC QN AUTO: 28 PG (ref 27–31)
MCHC RBC AUTO-ENTMCNC: 33.7 G/DL (ref 32–36)
MCV RBC AUTO: 83 FL (ref 82–98)
METHADONE UR QL SCN: NEGATIVE
NUCLEATED RBC (/100WBC) (OHS): 0 /100 WBC
OHS QRS DURATION: 86 MS
OHS QTC CALCULATION: 405 MS
OPIATES UR QL SCN: ABNORMAL
PCP UR QL: NEGATIVE
PLATELET # BLD AUTO: 258 K/UL (ref 150–450)
PMV BLD AUTO: 10.2 FL (ref 9.2–12.9)
POTASSIUM SERPL-SCNC: 3.5 MMOL/L (ref 3.5–5.1)
POTASSIUM SERPL-SCNC: 3.9 MMOL/L (ref 3.5–5.1)
PROT SERPL-MCNC: 6.9 GM/DL (ref 6–8.4)
RBC # BLD AUTO: 5 M/UL (ref 4.6–6.2)
RELATIVE EOSINOPHIL (OHS): 1.1 %
RELATIVE LYMPHOCYTE (OHS): 18.4 % (ref 18–48)
RELATIVE MONOCYTE (OHS): 14.5 % (ref 4–15)
RELATIVE NEUTROPHIL (OHS): 65.2 % (ref 38–73)
SODIUM SERPL-SCNC: 140 MMOL/L (ref 136–145)
SODIUM SERPL-SCNC: 143 MMOL/L (ref 136–145)
WBC # BLD AUTO: 6.63 K/UL (ref 3.9–12.7)

## 2025-05-19 PROCEDURE — 85025 COMPLETE CBC W/AUTO DIFF WBC: CPT | Performed by: HOSPITALIST

## 2025-05-19 PROCEDURE — G0378 HOSPITAL OBSERVATION PER HR: HCPCS

## 2025-05-19 PROCEDURE — 82565 ASSAY OF CREATININE: CPT | Performed by: HOSPITALIST

## 2025-05-19 PROCEDURE — 25000003 PHARM REV CODE 250: Performed by: HOSPITALIST

## 2025-05-19 PROCEDURE — 25500020 PHARM REV CODE 255: Performed by: EMERGENCY MEDICINE

## 2025-05-19 PROCEDURE — 99222 1ST HOSP IP/OBS MODERATE 55: CPT | Mod: ,,, | Performed by: INTERNAL MEDICINE

## 2025-05-19 PROCEDURE — 36415 COLL VENOUS BLD VENIPUNCTURE: CPT | Performed by: HOSPITALIST

## 2025-05-19 PROCEDURE — 63600175 PHARM REV CODE 636 W HCPCS: Performed by: EMERGENCY MEDICINE

## 2025-05-19 PROCEDURE — 36415 COLL VENOUS BLD VENIPUNCTURE: CPT | Performed by: STUDENT IN AN ORGANIZED HEALTH CARE EDUCATION/TRAINING PROGRAM

## 2025-05-19 PROCEDURE — 63600175 PHARM REV CODE 636 W HCPCS: Performed by: HOSPITALIST

## 2025-05-19 RX ORDER — CEFTRIAXONE 1 G/1
1 INJECTION, POWDER, FOR SOLUTION INTRAMUSCULAR; INTRAVENOUS
Status: COMPLETED | OUTPATIENT
Start: 2025-05-19 | End: 2025-05-19

## 2025-05-19 RX ORDER — HYDROCODONE BITARTRATE AND ACETAMINOPHEN 5; 325 MG/1; MG/1
1 TABLET ORAL EVERY 6 HOURS PRN
Refills: 0 | Status: DISCONTINUED | OUTPATIENT
Start: 2025-05-19 | End: 2025-05-19 | Stop reason: HOSPADM

## 2025-05-19 RX ORDER — ATORVASTATIN CALCIUM 10 MG/1
20 TABLET, FILM COATED ORAL DAILY
Status: DISCONTINUED | OUTPATIENT
Start: 2025-05-19 | End: 2025-05-19 | Stop reason: HOSPADM

## 2025-05-19 RX ORDER — MORPHINE SULFATE 4 MG/ML
4 INJECTION, SOLUTION INTRAMUSCULAR; INTRAVENOUS ONCE AS NEEDED
Status: COMPLETED | OUTPATIENT
Start: 2025-05-19 | End: 2025-05-19

## 2025-05-19 RX ORDER — AMOXICILLIN 250 MG
1 CAPSULE ORAL 2 TIMES DAILY PRN
Status: DISCONTINUED | OUTPATIENT
Start: 2025-05-19 | End: 2025-05-19 | Stop reason: HOSPADM

## 2025-05-19 RX ORDER — CARVEDILOL 12.5 MG/1
25 TABLET ORAL 2 TIMES DAILY
Status: DISCONTINUED | OUTPATIENT
Start: 2025-05-19 | End: 2025-05-19 | Stop reason: HOSPADM

## 2025-05-19 RX ORDER — ONDANSETRON HYDROCHLORIDE 2 MG/ML
4 INJECTION, SOLUTION INTRAVENOUS EVERY 8 HOURS PRN
Status: DISCONTINUED | OUTPATIENT
Start: 2025-05-19 | End: 2025-05-19 | Stop reason: HOSPADM

## 2025-05-19 RX ORDER — CLONIDINE HYDROCHLORIDE 0.1 MG/1
0.1 TABLET ORAL EVERY 6 HOURS PRN
Status: DISCONTINUED | OUTPATIENT
Start: 2025-05-19 | End: 2025-05-19 | Stop reason: HOSPADM

## 2025-05-19 RX ORDER — ACETAMINOPHEN 650 MG/1
650 SUPPOSITORY RECTAL EVERY 6 HOURS PRN
Status: DISCONTINUED | OUTPATIENT
Start: 2025-05-19 | End: 2025-05-19 | Stop reason: HOSPADM

## 2025-05-19 RX ORDER — NALOXONE HCL 0.4 MG/ML
0.02 VIAL (ML) INJECTION
Status: DISCONTINUED | OUTPATIENT
Start: 2025-05-19 | End: 2025-05-19 | Stop reason: HOSPADM

## 2025-05-19 RX ORDER — TALC
6 POWDER (GRAM) TOPICAL NIGHTLY PRN
Status: DISCONTINUED | OUTPATIENT
Start: 2025-05-19 | End: 2025-05-19 | Stop reason: HOSPADM

## 2025-05-19 RX ORDER — PROMETHAZINE HYDROCHLORIDE 25 MG/1
25 TABLET ORAL EVERY 6 HOURS PRN
Status: DISCONTINUED | OUTPATIENT
Start: 2025-05-19 | End: 2025-05-19 | Stop reason: HOSPADM

## 2025-05-19 RX ORDER — IBUPROFEN 200 MG
16 TABLET ORAL
Status: DISCONTINUED | OUTPATIENT
Start: 2025-05-19 | End: 2025-05-19 | Stop reason: HOSPADM

## 2025-05-19 RX ORDER — ALUMINUM HYDROXIDE, MAGNESIUM HYDROXIDE, AND SIMETHICONE 1200; 120; 1200 MG/30ML; MG/30ML; MG/30ML
30 SUSPENSION ORAL 4 TIMES DAILY PRN
Status: DISCONTINUED | OUTPATIENT
Start: 2025-05-19 | End: 2025-05-19 | Stop reason: HOSPADM

## 2025-05-19 RX ORDER — SODIUM CHLORIDE 0.9 % (FLUSH) 0.9 %
10 SYRINGE (ML) INJECTION EVERY 12 HOURS PRN
Status: DISCONTINUED | OUTPATIENT
Start: 2025-05-19 | End: 2025-05-19 | Stop reason: HOSPADM

## 2025-05-19 RX ORDER — HYDRALAZINE HYDROCHLORIDE 20 MG/ML
10 INJECTION INTRAMUSCULAR; INTRAVENOUS
Status: COMPLETED | OUTPATIENT
Start: 2025-05-19 | End: 2025-05-19

## 2025-05-19 RX ORDER — ACETAMINOPHEN 325 MG/1
650 TABLET ORAL EVERY 8 HOURS PRN
Status: DISCONTINUED | OUTPATIENT
Start: 2025-05-19 | End: 2025-05-19 | Stop reason: HOSPADM

## 2025-05-19 RX ORDER — IPRATROPIUM BROMIDE AND ALBUTEROL SULFATE 2.5; .5 MG/3ML; MG/3ML
3 SOLUTION RESPIRATORY (INHALATION) EVERY 6 HOURS PRN
Status: DISCONTINUED | OUTPATIENT
Start: 2025-05-19 | End: 2025-05-19 | Stop reason: HOSPADM

## 2025-05-19 RX ORDER — HYDRALAZINE HYDROCHLORIDE 20 MG/ML
10 INJECTION INTRAMUSCULAR; INTRAVENOUS EVERY 6 HOURS PRN
Status: DISCONTINUED | OUTPATIENT
Start: 2025-05-19 | End: 2025-05-19 | Stop reason: HOSPADM

## 2025-05-19 RX ORDER — MORPHINE SULFATE 4 MG/ML
2 INJECTION, SOLUTION INTRAMUSCULAR; INTRAVENOUS EVERY 4 HOURS PRN
Refills: 0 | Status: DISCONTINUED | OUTPATIENT
Start: 2025-05-19 | End: 2025-05-19 | Stop reason: HOSPADM

## 2025-05-19 RX ORDER — IBUPROFEN 200 MG
24 TABLET ORAL
Status: DISCONTINUED | OUTPATIENT
Start: 2025-05-19 | End: 2025-05-19 | Stop reason: HOSPADM

## 2025-05-19 RX ORDER — AMLODIPINE BESYLATE 5 MG/1
5 TABLET ORAL DAILY
Qty: 30 TABLET | Refills: 0 | Status: SHIPPED | OUTPATIENT
Start: 2025-05-19

## 2025-05-19 RX ORDER — GLUCAGON 1 MG
1 KIT INJECTION
Status: DISCONTINUED | OUTPATIENT
Start: 2025-05-19 | End: 2025-05-19 | Stop reason: HOSPADM

## 2025-05-19 RX ORDER — HYDROCODONE BITARTRATE AND ACETAMINOPHEN 5; 325 MG/1; MG/1
1 TABLET ORAL EVERY 12 HOURS PRN
Qty: 10 TABLET | Refills: 0 | Status: SHIPPED | OUTPATIENT
Start: 2025-05-19

## 2025-05-19 RX ADMIN — CEFTRIAXONE 1 G: 1 INJECTION, POWDER, FOR SOLUTION INTRAMUSCULAR; INTRAVENOUS at 02:05

## 2025-05-19 RX ADMIN — HYDRALAZINE HYDROCHLORIDE 10 MG: 20 INJECTION INTRAMUSCULAR; INTRAVENOUS at 02:05

## 2025-05-19 RX ADMIN — ONDANSETRON 4 MG: 2 INJECTION INTRAMUSCULAR; INTRAVENOUS at 05:05

## 2025-05-19 RX ADMIN — ATORVASTATIN CALCIUM 20 MG: 10 TABLET, FILM COATED ORAL at 10:05

## 2025-05-19 RX ADMIN — IOHEXOL 100 ML: 350 INJECTION, SOLUTION INTRAVENOUS at 12:05

## 2025-05-19 RX ADMIN — MORPHINE SULFATE 4 MG: 4 INJECTION INTRAVENOUS at 03:05

## 2025-05-19 RX ADMIN — ACETAMINOPHEN 650 MG: 325 TABLET ORAL at 10:05

## 2025-05-19 RX ADMIN — CARVEDILOL 25 MG: 12.5 TABLET, FILM COATED ORAL at 04:05

## 2025-05-19 NOTE — ASSESSMENT & PLAN NOTE
40 y.o. male with a PMH has a past medical history of Hypertension  presents to the ED for acute onset right flank pain, right groin pain, and gross hematuria which began earlier today. Patient recently discharged on 5/16/25 had to patient underwent left heart catheterization for presumed STEMI and was noted to have nonobstructive CAD. EKG with no changes.Soft tissue R groin us normal.CT of abdomen normal except Small high density linear focus right common femoral renal use possibly small catheter fragment. There is surrounding soft tissue swelling without focal pseudoaneurysm definitely identified. This could also represent closure device material which dissolves.    Pain from from inflammatory reasons/bruising from r femoral nerve. Recommend NSAID/toradol .

## 2025-05-19 NOTE — HPI
Jose Bloom is a 40 y.o. male with a PMH  has a past medical history of Hypertension and Murmur, cardiac. who presented to the ED for further evaluation of acute onset right flank pain, right groin pain, and gross hematuria which began earlier today.  Patient recently discharged on 5/16/25 had to patient underwent left heart catheterization for presumed STEMI and was noted to have nonobstructive CAD.  Patient discharged home on aspirin 81 mg daily, Lipitor 20 mg daily, losartan 100 mg daily, and Coreg 25 mg b.i.d. and reported compliance with home medications as well as dietary/fluid restrictions and was doing well prior to onset of symptoms.  Patient denied endorsing any new trauma and described endorsing right flank pain as throbbing/stabbing in nature, constant, rated 10/10 in severity at its worst but currently 3/5 following administration of pain medication in the ED with no known alleviating or aggravating factors noted.  Pain was noted to radiate down to his right groin and lower abdomen with no known alleviating factors and aggravating factors including palpation to the affected area.  He denied use of any other antiplatelet/anticoagulation therapies following hospital discharge, denied endorsing any urinary retention, and continues to have gross hematuria without passage of blood clots, and nonbloody bowel movements x2 prior to admission.  Prior to onset of symptoms, patient reported being in his usual state of health with no other concerns or complaints.  All other review of systems negative except as noted above.  Initial workup in the ED revealed patient to be afebrile without leukocytosis, hypertensive, CBC and CMP near baseline with the exception of creatinine/GFR measuring 1.6/56.  UA positive for 3+ blood, 55 RBCs, 6 WBCs.  Urine toxicology positive for opiates and THC.  CT abdomen/pelvis negative for acute findings.  Cardiology consulted by ED staff who recommended obtaining ultrasound of  soft tissue of right groin, kidney, and CTA chest/abdomen/pelvis inpatient will be evaluated in the morning.  Patient admitted to Hospital Medicine under observation for continued medical management while awaiting further evaluation/recommendations from Cardiology.    PCP: [place], Provider

## 2025-05-19 NOTE — ASSESSMENT & PLAN NOTE
Body mass index is 30.6 kg/m². Morbid obesity complicates all aspects of disease management from diagnostic modalities to treatment. Weight loss encouraged and health benefits explained to patient.

## 2025-05-19 NOTE — ASSESSMENT & PLAN NOTE
Patient with known CAD s/p C showing non-obstructive disease, which is controlled Will continue statin but hold antiplatelet/anticoagulation and monitor for S/Sx of angina/ACS. Continue to monitor on telemetry.

## 2025-05-19 NOTE — PLAN OF CARE
[facility] - Telemetry (Hospital)  Discharge Final Note    Primary Care Provider: Rachele Peter    Expected Discharge Date: 5/19/2025    Final Discharge Note (most recent)       Final Note - 05/19/25 1359          Final Note    Assessment Type Final Discharge Note     Anticipated Discharge Disposition Home or Self Care        Post-Acute Status    Discharge Delays None known at this time                     Important Message from Medicare               DC Disposition: home with family  Transportation: personal transportation    Patient had no equipment or placement needs from .     Patient has resources [facility]

## 2025-05-19 NOTE — SUBJECTIVE & OBJECTIVE
Past Medical History:   Diagnosis Date    Hypertension     Murmur, cardiac        Past Surgical History:   Procedure Laterality Date    CORONARY ANGIOGRAPHY N/A 5/14/2025    Procedure: ANGIOGRAM, CORONARY ARTERY;  Surgeon: Christ Butts MD;  Location: [facility] CATH LAB;  Service: Cardiology;  Laterality: N/A;    LEFT HEART CATHETERIZATION Left 5/14/2025    Procedure: Left heart cath;  Surgeon: Christ Butts MD;  Location: [facility] CATH LAB;  Service: Cardiology;  Laterality: Left;    VENTRICULOGRAM, LEFT  5/14/2025    Procedure: Ventriculogram, Left;  Surgeon: Christ Butts MD;  Location: [facility] CATH LAB;  Service: Cardiology;;       Review of patient's allergies indicates:  No Known Allergies    No current facility-administered medications on file prior to encounter.     Current Outpatient Medications on File Prior to Encounter   Medication Sig    aspirin (ECOTRIN) 81 MG EC tablet Take 1 tablet (81 mg total) by mouth once daily.    atorvastatin (LIPITOR) 20 MG tablet Take 1 tablet (20 mg total) by mouth every evening.    carvediloL (COREG) 25 MG tablet Take 1 tablet (25 mg total) by mouth 2 (two) times daily.    losartan (COZAAR) 100 MG tablet Take 1 tablet (100 mg total) by mouth once daily.     Family History       Problem Relation (Age of Onset)    Hypertension Mother, Father          Tobacco Use    Smoking status: Some Days     Current packs/day: 0.10     Types: Cigarettes    Smokeless tobacco: Never   Substance and Sexual Activity    Alcohol use: Yes     Comment: depends on the week, at most a couple drinks after work; this past week that has been daily    Drug use: No    Sexual activity: Not on file     Review of Systems   All other systems reviewed and are negative.    Objective:     Vital Signs (Most Recent):  Temp: 99.1 °F (37.3 °C) (05/19/25 0245)  Pulse: 60 (05/19/25 0245)  Resp: 20 (05/19/25 0308)  BP: (!) 159/101 (05/19/25 0256)  SpO2: 97 % (05/19/25 0245) Vital Signs (24h Range):  Temp:  [99.1 °F  (37.3 °C)-99.6 °F (37.6 °C)] 99.1 °F (37.3 °C)  Pulse:  [60-79] 60  Resp:  [16-20] 20  SpO2:  [95 %-97 %] 97 %  BP: (148-186)/() 159/101     Weight: 103.1 kg (227 lb 6.4 oz)  Body mass index is 30 kg/m².     Physical Exam  Vitals reviewed.   Constitutional:       General: He is in acute distress.      Appearance: Normal appearance. He is obese. He is not ill-appearing, toxic-appearing or diaphoretic.   HENT:      Head: Normocephalic and atraumatic.      Right Ear: External ear normal.      Left Ear: External ear normal.      Nose: Nose normal. No congestion or rhinorrhea.      Mouth/Throat:      Mouth: Mucous membranes are moist.      Pharynx: Oropharynx is clear. No oropharyngeal exudate or posterior oropharyngeal erythema.   Eyes:      General: No scleral icterus.     Extraocular Movements: Extraocular movements intact.      Conjunctiva/sclera: Conjunctivae normal.      Pupils: Pupils are equal, round, and reactive to light.   Neck:      Vascular: No carotid bruit.   Cardiovascular:      Rate and Rhythm: Normal rate and regular rhythm.      Pulses: Normal pulses.      Heart sounds: Normal heart sounds. No murmur heard.     No friction rub. No gallop.   Pulmonary:      Effort: Pulmonary effort is normal. No respiratory distress.      Breath sounds: Normal breath sounds. No stridor. No wheezing, rhonchi or rales.   Chest:      Chest wall: No tenderness.   Abdominal:      General: Abdomen is flat. Bowel sounds are normal. There is no distension.      Palpations: Abdomen is soft. There is no mass.      Tenderness: There is abdominal tenderness. There is right CVA tenderness. There is no left CVA tenderness, guarding or rebound.      Hernia: No hernia is present.      Comments: Mild TTP extending from right flank down to right lower quadrant and right groin.  Negative evidence of hematoma, ecchymoses, bleeding, or signs of infection overlying insertion site of right groin.   Musculoskeletal:         General: No  swelling, tenderness, deformity or signs of injury. Normal range of motion.      Cervical back: Normal range of motion and neck supple. No rigidity or tenderness.      Right lower leg: No edema.      Left lower leg: No edema.   Lymphadenopathy:      Cervical: No cervical adenopathy.   Skin:     General: Skin is warm and dry.      Capillary Refill: Capillary refill takes less than 2 seconds.      Coloration: Skin is not jaundiced or pale.      Findings: No bruising, erythema, lesion or rash.   Neurological:      General: No focal deficit present.      Mental Status: He is alert and oriented to person, place, and time. Mental status is at baseline.      Cranial Nerves: No cranial nerve deficit.      Sensory: No sensory deficit.      Motor: No weakness.      Coordination: Coordination normal.   Psychiatric:         Mood and Affect: Mood normal.         Behavior: Behavior normal.         Thought Content: Thought content normal.         Judgment: Judgment normal.              CRANIAL NERVES     CN III, IV, VI   Pupils are equal, round, and reactive to light.       Significant Labs: All pertinent labs within the past 24 hours have been reviewed.    Significant Imaging: I have reviewed all pertinent imaging results/findings within the past 24 hours.    LABS:  Recent Results (from the past 24 hours)   EKG 12-lead    Collection Time: 05/18/25  5:26 PM   Result Value Ref Range    QRS Duration 86 ms    [facility] QTC Calculation 405 ms   Comprehensive metabolic panel    Collection Time: 05/18/25  6:43 PM   Result Value Ref Range    Sodium 138 136 - 145 mmol/L    Potassium 3.6 3.5 - 5.1 mmol/L    Chloride 105 95 - 110 mmol/L    CO2 22 (L) 23 - 29 mmol/L    Glucose 96 70 - 110 mg/dL    BUN 16 6 - 20 mg/dL    Creatinine 1.6 (H) 0.5 - 1.4 mg/dL    Calcium 9.7 8.7 - 10.5 mg/dL    Protein Total 7.7 6.0 - 8.4 gm/dL    Albumin 4.3 3.5 - 5.2 g/dL    Bilirubin Total 0.5 0.1 - 1.0 mg/dL    ALP 61 40 - 150 unit/L    AST 25 11 - 45 unit/L    ALT  42 10 - 44 unit/L    Anion Gap 11 8 - 16 mmol/L    eGFR 56 (L) >60 mL/min/1.73/m2   Lipase    Collection Time: 05/18/25  6:43 PM   Result Value Ref Range    Lipase Level 48 4 - 60 U/L   CBC with Differential    Collection Time: 05/18/25  6:43 PM   Result Value Ref Range    WBC 6.07 3.90 - 12.70 K/uL    RBC 4.89 4.60 - 6.20 M/uL    HGB 13.9 (L) 14.0 - 18.0 gm/dL    HCT 40.1 40.0 - 54.0 %    MCV 82 82 - 98 fL    MCH 28.4 27.0 - 31.0 pg    MCHC 34.7 32.0 - 36.0 g/dL    RDW 12.6 11.5 - 14.5 %    Platelet Count 253 150 - 450 K/uL    MPV 10.4 9.2 - 12.9 fL    Nucleated RBC 0 <=0 /100 WBC    Neut % 58.2 38 - 73 %    Lymph % 21.4 18 - 48 %    Mono % 15.0 4 - 15 %    Eos % 4.6 <=8 %    Basophil % 0.5 <=1.9 %    Imm Grans % 0.3 0.0 - 0.5 %    Neut # 3.53 1.8 - 7.7 K/uL    Lymph # 1.30 1 - 4.8 K/uL    Mono # 0.91 0.3 - 1 K/uL    Eos # 0.28 <=0.5 K/uL    Baso # 0.03 <=0.2 K/uL    Imm Grans # 0.02 0.00 - 0.04 K/uL   CPK    Collection Time: 05/18/25  6:43 PM   Result Value Ref Range     20 - 200 U/L   Urinalysis, Reflex to Urine Culture Urine, Clean Catch    Collection Time: 05/18/25  8:18 PM    Specimen: Urine, Clean Catch   Result Value Ref Range    Color, UA Colorless (A) Straw, Ila, Yellow, Light-Orange    Appearance, UA Clear Clear    pH, UA 6.0 5.0 - 8.0    Spec Grav UA 1.005 1.005 - 1.030    Protein, UA 1+ (A) Negative    Glucose, UA Negative Negative    Ketones, UA Negative Negative    Bilirubin, UA Negative Negative    Blood, UA 3+ (A) Negative    Nitrites, UA Negative Negative    Urobilinogen, UA Negative <2.0 EU/dL    Leukocyte Esterase, UA Negative Negative   GREY TOP URINE HOLD    Collection Time: 05/18/25  8:18 PM   Result Value Ref Range    Extra Tube Hold for add-ons.    Urinalysis Microscopic    Collection Time: 05/18/25  8:18 PM   Result Value Ref Range    RBC, UA 55 (H) 0 - 4 /HPF    WBC, UA 6 (H) 0 - 5 /HPF    Bacteria, UA Occasional None, Rare, Occasional /HPF    Hyaline Casts, UA 0 0 - 1 /LPF     Microscopic Comment     Drug screen panel, emergency    Collection Time: 05/18/25  8:18 PM   Result Value Ref Range    Benzodiazepine, Urine Negative Negative    Methadone, Urine Negative Negative    Cocaine, Urine Negative Negative    Opiates, Urine Presumptive Positive (A) Negative    Barbiturates, Urine Negative Negative    Amphetamines, Urine Negative Negative    THC Presumptive Positive (A) Negative    Phencyclidine, Urine Negative Negative    Urine Creatinine 61.7 23.0 - 375.0 mg/dL       RADIOLOGY  CT Abdomen Pelvis  Without Contrast  Result Date: 5/18/2025  EXAM: CT ABDOMEN PELVIS WITHOUT CONTRAST CLINICAL HISTORY:  Flank pain COMPARISON: None available. TECHNIQUE: Axial CT imaging through the abdomen and pelvis performed without intravenous contrast are submitted for interpretation. Multiplanar reformats were performed and interpreted. FINDINGS: The lung bases are clear. No hydronephrosis, urolithiasis, or perinephric stranding. The liver is normal in size and contour. The spleen and pancreas have a normal non-contrasted appearance. The adrenal glands are within normal limits. The gallbladder is unremarkable. No free intraperitoneal fluid or air.  The bowel is within normal limits.  The appendix is normal. No abdominal lymphadenopathy. Aorta caliber is normal. Pelvis:     The urinary bladder is normal. Moderate degenerative disease at L4-5.     No acute abnormality identified in the abdomen or pelvis. All CT scans at [this location] are performed using dose modulation techniques as appropriate to a performed exam including the following: automated exposure control; adjustment of the mA and/or kV according to patient size (this includes techniques or standardized protocols for targeted exams where dose is matched to indication / reason for exam; i.e. extremities or head); use of iterative reconstruction technique. RADIOLOGIST: JUAN Olivo M.D. Finalized on: 5/18/2025 8:29 PM By:  JUAN Olivo MD, MD [facility]#  [id number]      2025-05-18 20:31:[id number]     [facility]    Echo  Result Date: 5/15/2025    Left Ventricle: The left ventricle is normal in size. Mildly increased wall thickness. There is mild concentric hypertrophy. There is normal systolic function with a visually estimated ejection fraction of 60 - 65%. There is normal diastolic function.   Right Ventricle: The right ventricle is normal in size Wall thickness is normal. Systolic function is normal.   Mitral Valve: There is mild regurgitation.   Pulmonary Artery: Pulmonary artery pressure could not be estimated.   IVC/SVC: Normal venous pressure at 3 mmHg.     Cardiac catheterization  Result Date: 5/14/2025    The ejection fraction was calculated to be 65%.   The ejection fraction was greater than 55% by visual estimate.   The pre-procedure left ventricular end diastolic pressure was 15.   The post-procedure left ventricular end diastolic pressure was 19.   The estimated blood loss was none.   There was non-obstructive coronary artery disease..   There was diastolic dysfunction.   The filling pressures on the left were mildly elevated.   Agressive HTN control The procedure log was documented by Documenter: Josie Polanco RN and verified by Christ Butts MD. Date: 5/14/2025  Time: 9:34 PM     X-Ray Chest PA And Lateral  No focal consolidation or effusion.      EKG    MICROBIOLOGY    MDM

## 2025-05-19 NOTE — SUBJECTIVE & OBJECTIVE
Past Medical History:   Diagnosis Date    Hypertension     Murmur, cardiac        Past Surgical History:   Procedure Laterality Date    CORONARY ANGIOGRAPHY N/A 5/14/2025    Procedure: ANGIOGRAM, CORONARY ARTERY;  Surgeon: Christ Butts MD;  Location: [facility] CATH LAB;  Service: Cardiology;  Laterality: N/A;    LEFT HEART CATHETERIZATION Left 5/14/2025    Procedure: Left heart cath;  Surgeon: Christ Butts MD;  Location: [facility] CATH LAB;  Service: Cardiology;  Laterality: Left;    VENTRICULOGRAM, LEFT  5/14/2025    Procedure: Ventriculogram, Left;  Surgeon: Christ Butts MD;  Location: [facility] CATH LAB;  Service: Cardiology;;       Review of patient's allergies indicates:  No Known Allergies    No current facility-administered medications on file prior to encounter.     Current Outpatient Medications on File Prior to Encounter   Medication Sig    aspirin (ECOTRIN) 81 MG EC tablet Take 1 tablet (81 mg total) by mouth once daily.    atorvastatin (LIPITOR) 20 MG tablet Take 1 tablet (20 mg total) by mouth every evening.    carvediloL (COREG) 25 MG tablet Take 1 tablet (25 mg total) by mouth 2 (two) times daily.    losartan (COZAAR) 100 MG tablet Take 1 tablet (100 mg total) by mouth once daily.     Family History       Problem Relation (Age of Onset)    Hypertension Mother, Father          Tobacco Use    Smoking status: Some Days     Current packs/day: 0.10     Types: Cigarettes    Smokeless tobacco: Never   Substance and Sexual Activity    Alcohol use: Yes     Comment: depends on the week, at most a couple drinks after work; this past week that has been daily    Drug use: No    Sexual activity: Not on file     Review of Systems   Constitutional: Negative. Negative for weight gain.   HENT: Negative.     Eyes: Negative.    Cardiovascular: Negative.  Negative for chest pain, leg swelling and palpitations.   Respiratory: Negative.  Negative for shortness of breath.    Endocrine: Negative.    Hematologic/Lymphatic:  Negative.    Skin: Negative.    Musculoskeletal:  Negative for muscle weakness.   Gastrointestinal: Negative.    Genitourinary: Negative.    Neurological: Negative.  Negative for dizziness.   Psychiatric/Behavioral: Negative.     Allergic/Immunologic: Negative.    All other systems reviewed and are negative.    Objective:     Vital Signs (Most Recent):  Temp: 98.4 °F (36.9 °C) (05/19/25 0759)  Pulse: (!) 57 (05/19/25 0759)  Resp: 18 (05/19/25 0510)  BP: 139/81 (05/19/25 0759)  SpO2: 97 % (05/19/25 0759) Vital Signs (24h Range):  Temp:  [98.3 °F (36.8 °C)-99.6 °F (37.6 °C)] 98.4 °F (36.9 °C)  Pulse:  [57-79] 57  Resp:  [16-20] 18  SpO2:  [95 %-98 %] 97 %  BP: (139-186)/() 139/81     Weight: 105.2 kg (231 lb 14.8 oz)  Body mass index is 30.6 kg/m².    SpO2: 97 %         Intake/Output Summary (Last 24 hours) at 5/19/2025 0906  Last data filed at 5/19/2025 0446  Gross per 24 hour   Intake 2997 ml   Output 2250 ml   Net 747 ml       Lines/Drains/Airways       Peripheral Intravenous Line  Duration                  Peripheral IV - Single Lumen 05/18/25 1814 18 G 1 1/4 in Anterior;Right Upper Arm <1 day                     Physical Exam  Vitals and nursing note reviewed.   Constitutional:       Appearance: He is well-developed.   HENT:      Head: Normocephalic and atraumatic.   Eyes:      Conjunctiva/sclera: Conjunctivae normal.      Pupils: Pupils are equal, round, and reactive to light.   Cardiovascular:      Rate and Rhythm: Normal rate and regular rhythm.      Pulses: Intact distal pulses.      Heart sounds: Normal heart sounds.   Pulmonary:      Effort: Pulmonary effort is normal.      Breath sounds: Normal breath sounds.   Abdominal:      General: Bowel sounds are normal.      Palpations: Abdomen is soft.   Musculoskeletal:      Cervical back: Normal range of motion and neck supple.   Skin:     General: Skin is warm and dry.   Neurological:      Mental Status: He is alert and oriented to person, place, and time.           Significant Labs: All pertinent lab results from the last 24 hours have been reviewed.    Significant Imaging: X-Ray: CXR: X-Ray Chest 1 View (CXR): No results found for this visit on 05/18/25.

## 2025-05-19 NOTE — CONSULTS
[facility] - Telemetry ([facility])  Cardiology  Consult Note    Patient Name: Jose Bloom  MRN: [id number]  Admission Date: 5/18/2025  Hospital Length of Stay: 0 days  Code Status: Full Code   Attending Provider: Blake Killian DO   Consulting Provider: Atif Simon MD  Primary Care Physician: [place], Provider  Principal Problem:Right groin pain    Patient information was obtained from patient and ER records.     Inpatient consult to Cardiology  Consult performed by: Atif Simon MD  Consult ordered by: Juan A Villalobos MD        Subjective:     Chief Complaint:  R groin pain     HPI:   40 y.o. male with a PMH has a past medical history of Hypertension  presents to the ED for acute onset right flank pain, right groin pain, and gross hematuria which began earlier today. Patient recently discharged on 5/16/25 had to patient underwent left heart catheterization for presumed STEMI and was noted to have nonobstructive CAD. EKG with no changes.Soft tissue R groin us normal.CT of abdomen normal except Small high density linear focus right common femoral renal use possibly small catheter fragment. There is surrounding soft tissue swelling without focal pseudoaneurysm definitely identified. This could also represent closure device material.     Past Medical History:   Diagnosis Date    Hypertension     Murmur, cardiac        Past Surgical History:   Procedure Laterality Date    CORONARY ANGIOGRAPHY N/A 5/14/2025    Procedure: ANGIOGRAM, CORONARY ARTERY;  Surgeon: Christ Butts MD;  Location: [facility] CATH LAB;  Service: Cardiology;  Laterality: N/A;    LEFT HEART CATHETERIZATION Left 5/14/2025    Procedure: Left heart cath;  Surgeon: Christ Butts MD;  Location: [facility] CATH LAB;  Service: Cardiology;  Laterality: Left;    VENTRICULOGRAM, LEFT  5/14/2025    Procedure: Ventriculogram, Left;  Surgeon: Christ Butts MD;  Location: [facility] CATH LAB;  Service: Cardiology;;       Review of  patient's allergies indicates:  No Known Allergies    No current facility-administered medications on file prior to encounter.     Current Outpatient Medications on File Prior to Encounter   Medication Sig    aspirin (ECOTRIN) 81 MG EC tablet Take 1 tablet (81 mg total) by mouth once daily.    atorvastatin (LIPITOR) 20 MG tablet Take 1 tablet (20 mg total) by mouth every evening.    carvediloL (COREG) 25 MG tablet Take 1 tablet (25 mg total) by mouth 2 (two) times daily.    losartan (COZAAR) 100 MG tablet Take 1 tablet (100 mg total) by mouth once daily.     Family History       Problem Relation (Age of Onset)    Hypertension Mother, Father          Tobacco Use    Smoking status: Some Days     Current packs/day: 0.10     Types: Cigarettes    Smokeless tobacco: Never   Substance and Sexual Activity    Alcohol use: Yes     Comment: depends on the week, at most a couple drinks after work; this past week that has been daily    Drug use: No    Sexual activity: Not on file     Review of Systems   Constitutional: Negative. Negative for weight gain.   HENT: Negative.     Eyes: Negative.    Cardiovascular: Negative.  Negative for chest pain, leg swelling and palpitations.   Respiratory: Negative.  Negative for shortness of breath.    Endocrine: Negative.    Hematologic/Lymphatic: Negative.    Skin: Negative.    Musculoskeletal:  Negative for muscle weakness.   Gastrointestinal: Negative.    Genitourinary: Negative.    Neurological: Negative.  Negative for dizziness.   Psychiatric/Behavioral: Negative.     Allergic/Immunologic: Negative.    All other systems reviewed and are negative.    Objective:     Vital Signs (Most Recent):  Temp: 98.4 °F (36.9 °C) (05/19/25 0759)  Pulse: (!) 57 (05/19/25 0759)  Resp: 18 (05/19/25 0510)  BP: 139/81 (05/19/25 0759)  SpO2: 97 % (05/19/25 0759) Vital Signs (24h Range):  Temp:  [98.3 °F (36.8 °C)-99.6 °F (37.6 °C)] 98.4 °F (36.9 °C)  Pulse:  [57-79] 57  Resp:  [16-20] 18  SpO2:  [95 %-98 %]  97 %  BP: (139-186)/() 139/81     Weight: 105.2 kg (231 lb 14.8 oz)  Body mass index is 30.6 kg/m².    SpO2: 97 %         Intake/Output Summary (Last 24 hours) at 5/19/2025 0906  Last data filed at 5/19/2025 0446  Gross per 24 hour   Intake 2997 ml   Output 2250 ml   Net 747 ml       Lines/Drains/Airways       Peripheral Intravenous Line  Duration                  Peripheral IV - Single Lumen 05/18/25 1814 18 G 1 1/4 in Anterior;Right Upper Arm <1 day                     Physical Exam  Vitals and nursing note reviewed.   Constitutional:       Appearance: He is well-developed.   HENT:      Head: Normocephalic and atraumatic.   Eyes:      Conjunctiva/sclera: Conjunctivae normal.      Pupils: Pupils are equal, round, and reactive to light.   Cardiovascular:      Rate and Rhythm: Normal rate and regular rhythm.      Pulses: Intact distal pulses.      Heart sounds: Normal heart sounds.   Pulmonary:      Effort: Pulmonary effort is normal.      Breath sounds: Normal breath sounds.   Abdominal:      General: Bowel sounds are normal.      Palpations: Abdomen is soft.   Musculoskeletal:      Cervical back: Normal range of motion and neck supple.   Skin:     General: Skin is warm and dry.   Neurological:      Mental Status: He is alert and oriented to person, place, and time.          Significant Labs: All pertinent lab results from the last 24 hours have been reviewed.    Significant Imaging: X-Ray: CXR: X-Ray Chest 1 View (CXR): No results found for this visit on 05/18/25.  Assessment and Plan:     * Right groin pain  40 y.o. male with a PMH has a past medical history of Hypertension  presents to the ED for acute onset right flank pain, right groin pain, and gross hematuria which began earlier today. Patient recently discharged on 5/16/25 had to patient underwent left heart catheterization for presumed STEMI and was noted to have nonobstructive CAD. EKG with no changes.Soft tissue R groin us normal.CT of abdomen  normal except Small high density linear focus right common femoral renal use possibly small catheter fragment. There is surrounding soft tissue swelling without focal pseudoaneurysm definitely identified. This could also represent closure device material which dissolves.    Pain from from inflammatory reasons/bruising from r femoral nerve. Recommend NSAID/toradol .        VTE Risk Mitigation (From admission, onward)           Ordered     Reason for No Pharmacological VTE Prophylaxis  Once        Question Answer Comment   Reasons: Active Bleeding    Reasons: Risk of Bleeding        05/19/25 0345     IP VTE HIGH RISK PATIENT  Once         05/19/25 0345     Place sequential compression device  Until discontinued         05/19/25 0345                    Thank you for your consult. I will sign off. Please contact us if you have any additional questions.    Atif Simon MD  Cardiology   [facility] - Telemetry ([facility])

## 2025-05-19 NOTE — DISCHARGE INSTRUCTIONS
-You were admitted to our hospital for treatment of groin pain concerns after recent heart catheterization procedure. Over the course of your hospitalization, our Cardiology team evaluated you.    -Please read the attached information regarding flank pain, acute kidney injury, cardiac catheterization at adverse effects and go to your nearest ED if any of the alarm/concerning signs develop.    -We have placed a referral to Internal Medicine.  Please give our scheduling line a call at [phone] to schedule an appointment with them if it has not been setup already.  It is extremely important for you to follow up with a healthcare provider to make sure your urinalysis and renal function normalizes and there is no recurrence of hematuria.  We also made adjustments to your home antihypertensives due to your abnormal renal function.  We started you on a medication called amlodipine instead of your losartan.  Please make sure to follow up with your upcoming cardiology appointment or go to another healthcare provider as they can reassess your home antihypertensive regimen. Please make sure to frequently monitor your home BP, if it remains elevated please reach out to your healthcare provider for resumption.  Maintain a BP log and take it to your next healthcare provider visit within 1 week.      -Please follow the safety precautions that we discussed when taking your pain medications and read the attached instructions on taking opioid safely and go to your nearest ED if any of the alarm/concerning signs develop.      -Your labs remained stable, but some labs still remained abnormal. As we discussed, it is extremely important for you to followup with a primary care physician within 1 week for repeat lab work to ensure normalization, follow up of pending labs, medication adjustments/refills, routine post-discharge care, and any other evaluations as necessitated. We have also placed a referral to [facility]  for you to set up with a PCP if you do not have one already. Please give our scheduling line a call at [phone] to schedule an appointment with them if it has not been setup already.       Our goal at [facility] is to always give you outstanding care and exceptional service. You may receive a survey from [employer] by mail, text or e-mail in the next 24-48 hours asking about the care you received with us. The survey should only take 5-10 minutes to complete and is very important to us.     Your feedback provides us with a way to recognize our staff who work tirelessly to provide the best care! Also, your responses help us learn how to improve when your experience was below our aspiration of excellence. We are always looking for ways to improve your stay. We WILL use your feedback to continue making improvements to help us provide the highest quality care. We keep your personal information and feedback confidential. We appreciate your time completing this survey and can't wait to hear from you!!!    We look forward to your continued care with us! Thanks so much for choosing [facility] for your healthcare needs!  Hormone

## 2025-05-19 NOTE — ASSESSMENT & PLAN NOTE
Chronic, uncontrolled.  Latest blood pressure and vitals reviewed-   Temp:  [98.3 °F (36.8 °C)-99.6 °F (37.6 °C)]   Pulse:  [58-79]   Resp:  [16-20]   BP: (148-186)/()   SpO2:  [95 %-98 %] .   Home meds for hypertension were reviewed and noted below.   Hypertension Medications              carvediloL (COREG) 25 MG tablet Take 1 tablet (25 mg total) by mouth 2 (two) times daily.    losartan (COZAAR) 100 MG tablet Take 1 tablet (100 mg total) by mouth once daily.     While in the hospital, will manage blood pressure as follows; Continue home antihypertensive regimen    Will utilize p.r.n. blood pressure medication only if patient's blood pressure greater than  180/110 and he develops symptoms such as worsening chest pain or shortness of breath.

## 2025-05-19 NOTE — H&P
[facility] Medicine  History & Physical    Patient Name: Jose Bloom  MRN: [id number]  Patient Class: OP- Observation  Admission Date: 5/18/2025  Attending Physician: Juan A Villalobos MD   Primary Care Provider: Rachele Peter         Patient information was obtained from patient, past medical records, and ER records.     Subjective:     Principal Problem:Right groin pain    Chief Complaint:   Chief Complaint   Patient presents with    Hypertension     Pt with recent history of MI and heart cath c/o elevated BP.  Pt also c/o bilateral flank pain and abdominal pain.        HPI: Jose Bloom is a 40 y.o. male with a PMH  has a past medical history of Hypertension and Murmur, cardiac. who presented to the ED for further evaluation of acute onset right flank pain, right groin pain, and gross hematuria which began earlier today.  Patient recently discharged on 5/16/25 had to patient underwent left heart catheterization for presumed STEMI and was noted to have nonobstructive CAD.  Patient discharged home on aspirin 81 mg daily, Lipitor 20 mg daily, losartan 100 mg daily, and Coreg 25 mg b.i.d. and reported compliance with home medications as well as dietary/fluid restrictions and was doing well prior to onset of symptoms.  Patient denied endorsing any new trauma and described endorsing right flank pain as throbbing/stabbing in nature, constant, rated 10/10 in severity at its worst but currently 3/5 following administration of pain medication in the ED with no known alleviating or aggravating factors noted.  Pain was noted to radiate down to his right groin and lower abdomen with no known alleviating factors and aggravating factors including palpation to the affected area.  He denied use of any other antiplatelet/anticoagulation therapies following hospital discharge, denied endorsing any urinary retention, and continues to have gross hematuria without passage of blood  clots, and nonbloody bowel movements x2 prior to admission.  Prior to onset of symptoms, patient reported being in his usual state of health with no other concerns or complaints.  All other review of systems negative except as noted above.  Initial workup in the ED revealed patient to be afebrile without leukocytosis, hypertensive, CBC and CMP near baseline with the exception of creatinine/GFR measuring 1.6/56.  UA positive for 3+ blood, 55 RBCs, 6 WBCs.  Urine toxicology positive for opiates and THC.  CT abdomen/pelvis negative for acute findings.  Cardiology consulted by ED staff who recommended obtaining ultrasound of soft tissue of right groin, kidney, and CTA chest/abdomen/pelvis inpatient will be evaluated in the morning.  Patient admitted to Hospital Medicine under observation for continued medical management while awaiting further evaluation/recommendations from Cardiology.    PCP: Rachele Peter      Past Medical History:   Diagnosis Date    Hypertension     Murmur, cardiac        Past Surgical History:   Procedure Laterality Date    CORONARY ANGIOGRAPHY N/A 5/14/2025    Procedure: ANGIOGRAM, CORONARY ARTERY;  Surgeon: Christ Butts MD;  Location: [facility] CATH LAB;  Service: Cardiology;  Laterality: N/A;    LEFT HEART CATHETERIZATION Left 5/14/2025    Procedure: Left heart cath;  Surgeon: Christ Butts MD;  Location: [facility] CATH LAB;  Service: Cardiology;  Laterality: Left;    VENTRICULOGRAM, LEFT  5/14/2025    Procedure: Ventriculogram, Left;  Surgeon: Christ Butts MD;  Location: [facility] CATH LAB;  Service: Cardiology;;       Review of patient's allergies indicates:  No Known Allergies    No current facility-administered medications on file prior to encounter.     Current Outpatient Medications on File Prior to Encounter   Medication Sig    aspirin (ECOTRIN) 81 MG EC tablet Take 1 tablet (81 mg total) by mouth once daily.    atorvastatin (LIPITOR) 20 MG tablet Take 1 tablet (20 mg total) by  mouth every evening.    carvediloL (COREG) 25 MG tablet Take 1 tablet (25 mg total) by mouth 2 (two) times daily.    losartan (COZAAR) 100 MG tablet Take 1 tablet (100 mg total) by mouth once daily.     Family History       Problem Relation (Age of Onset)    Hypertension Mother, Father          Tobacco Use    Smoking status: Some Days     Current packs/day: 0.10     Types: Cigarettes    Smokeless tobacco: Never   Substance and Sexual Activity    Alcohol use: Yes     Comment: depends on the week, at most a couple drinks after work; this past week that has been daily    Drug use: No    Sexual activity: Not on file     Review of Systems   All other systems reviewed and are negative.    Objective:     Vital Signs (Most Recent):  Temp: 99.1 °F (37.3 °C) (05/19/25 0245)  Pulse: 60 (05/19/25 0245)  Resp: 20 (05/19/25 0308)  BP: (!) 159/101 (05/19/25 0256)  SpO2: 97 % (05/19/25 0245) Vital Signs (24h Range):  Temp:  [99.1 °F (37.3 °C)-99.6 °F (37.6 °C)] 99.1 °F (37.3 °C)  Pulse:  [60-79] 60  Resp:  [16-20] 20  SpO2:  [95 %-97 %] 97 %  BP: (148-186)/() 159/101     Weight: 103.1 kg (227 lb 6.4 oz)  Body mass index is 30 kg/m².     Physical Exam  Vitals reviewed.   Constitutional:       General: He is in acute distress.      Appearance: Normal appearance. He is obese. He is not ill-appearing, toxic-appearing or diaphoretic.   HENT:      Head: Normocephalic and atraumatic.      Right Ear: External ear normal.      Left Ear: External ear normal.      Nose: Nose normal. No congestion or rhinorrhea.      Mouth/Throat:      Mouth: Mucous membranes are moist.      Pharynx: Oropharynx is clear. No oropharyngeal exudate or posterior oropharyngeal erythema.   Eyes:      General: No scleral icterus.     Extraocular Movements: Extraocular movements intact.      Conjunctiva/sclera: Conjunctivae normal.      Pupils: Pupils are equal, round, and reactive to light.   Neck:      Vascular: No carotid bruit.   Cardiovascular:      Rate  and Rhythm: Normal rate and regular rhythm.      Pulses: Normal pulses.      Heart sounds: Normal heart sounds. No murmur heard.     No friction rub. No gallop.   Pulmonary:      Effort: Pulmonary effort is normal. No respiratory distress.      Breath sounds: Normal breath sounds. No stridor. No wheezing, rhonchi or rales.   Chest:      Chest wall: No tenderness.   Abdominal:      General: Abdomen is flat. Bowel sounds are normal. There is no distension.      Palpations: Abdomen is soft. There is no mass.      Tenderness: There is abdominal tenderness. There is right CVA tenderness. There is no left CVA tenderness, guarding or rebound.      Hernia: No hernia is present.      Comments: Mild TTP extending from right flank down to right lower quadrant and right groin.  Negative evidence of hematoma, ecchymoses, bleeding, or signs of infection overlying insertion site of right groin.   Musculoskeletal:         General: No swelling, tenderness, deformity or signs of injury. Normal range of motion.      Cervical back: Normal range of motion and neck supple. No rigidity or tenderness.      Right lower leg: No edema.      Left lower leg: No edema.   Lymphadenopathy:      Cervical: No cervical adenopathy.   Skin:     General: Skin is warm and dry.      Capillary Refill: Capillary refill takes less than 2 seconds.      Coloration: Skin is not jaundiced or pale.      Findings: No bruising, erythema, lesion or rash.   Neurological:      General: No focal deficit present.      Mental Status: He is alert and oriented to person, place, and time. Mental status is at baseline.      Cranial Nerves: No cranial nerve deficit.      Sensory: No sensory deficit.      Motor: No weakness.      Coordination: Coordination normal.   Psychiatric:         Mood and Affect: Mood normal.         Behavior: Behavior normal.         Thought Content: Thought content normal.         Judgment: Judgment normal.              CRANIAL NERVES     CN III, IV,  VI   Pupils are equal, round, and reactive to light.       Significant Labs: All pertinent labs within the past 24 hours have been reviewed.    Significant Imaging: I have reviewed all pertinent imaging results/findings within the past 24 hours.    LABS:  Recent Results (from the past 24 hours)   EKG 12-lead    Collection Time: 05/18/25  5:26 PM   Result Value Ref Range    QRS Duration 86 ms    [facility] QTC Calculation 405 ms   Comprehensive metabolic panel    Collection Time: 05/18/25  6:43 PM   Result Value Ref Range    Sodium 138 136 - 145 mmol/L    Potassium 3.6 3.5 - 5.1 mmol/L    Chloride 105 95 - 110 mmol/L    CO2 22 (L) 23 - 29 mmol/L    Glucose 96 70 - 110 mg/dL    BUN 16 6 - 20 mg/dL    Creatinine 1.6 (H) 0.5 - 1.4 mg/dL    Calcium 9.7 8.7 - 10.5 mg/dL    Protein Total 7.7 6.0 - 8.4 gm/dL    Albumin 4.3 3.5 - 5.2 g/dL    Bilirubin Total 0.5 0.1 - 1.0 mg/dL    ALP 61 40 - 150 unit/L    AST 25 11 - 45 unit/L    ALT 42 10 - 44 unit/L    Anion Gap 11 8 - 16 mmol/L    eGFR 56 (L) >60 mL/min/1.73/m2   Lipase    Collection Time: 05/18/25  6:43 PM   Result Value Ref Range    Lipase Level 48 4 - 60 U/L   CBC with Differential    Collection Time: 05/18/25  6:43 PM   Result Value Ref Range    WBC 6.07 3.90 - 12.70 K/uL    RBC 4.89 4.60 - 6.20 M/uL    HGB 13.9 (L) 14.0 - 18.0 gm/dL    HCT 40.1 40.0 - 54.0 %    MCV 82 82 - 98 fL    MCH 28.4 27.0 - 31.0 pg    MCHC 34.7 32.0 - 36.0 g/dL    RDW 12.6 11.5 - 14.5 %    Platelet Count 253 150 - 450 K/uL    MPV 10.4 9.2 - 12.9 fL    Nucleated RBC 0 <=0 /100 WBC    Neut % 58.2 38 - 73 %    Lymph % 21.4 18 - 48 %    Mono % 15.0 4 - 15 %    Eos % 4.6 <=8 %    Basophil % 0.5 <=1.9 %    Imm Grans % 0.3 0.0 - 0.5 %    Neut # 3.53 1.8 - 7.7 K/uL    Lymph # 1.30 1 - 4.8 K/uL    Mono # 0.91 0.3 - 1 K/uL    Eos # 0.28 <=0.5 K/uL    Baso # 0.03 <=0.2 K/uL    Imm Grans # 0.02 0.00 - 0.04 K/uL   CPK    Collection Time: 05/18/25  6:43 PM   Result Value Ref Range     20 - 200 U/L    Urinalysis, Reflex to Urine Culture Urine, Clean Catch    Collection Time: 05/18/25  8:18 PM    Specimen: Urine, Clean Catch   Result Value Ref Range    Color, UA Colorless (A) Straw, Ila, Yellow, Light-Orange    Appearance, UA Clear Clear    pH, UA 6.0 5.0 - 8.0    Spec Grav UA 1.005 1.005 - 1.030    Protein, UA 1+ (A) Negative    Glucose, UA Negative Negative    Ketones, UA Negative Negative    Bilirubin, UA Negative Negative    Blood, UA 3+ (A) Negative    Nitrites, UA Negative Negative    Urobilinogen, UA Negative <2.0 EU/dL    Leukocyte Esterase, UA Negative Negative   GREY TOP URINE HOLD    Collection Time: 05/18/25  8:18 PM   Result Value Ref Range    Extra Tube Hold for add-ons.    Urinalysis Microscopic    Collection Time: 05/18/25  8:18 PM   Result Value Ref Range    RBC, UA 55 (H) 0 - 4 /HPF    WBC, UA 6 (H) 0 - 5 /HPF    Bacteria, UA Occasional None, Rare, Occasional /HPF    Hyaline Casts, UA 0 0 - 1 /LPF    Microscopic Comment     Drug screen panel, emergency    Collection Time: 05/18/25  8:18 PM   Result Value Ref Range    Benzodiazepine, Urine Negative Negative    Methadone, Urine Negative Negative    Cocaine, Urine Negative Negative    Opiates, Urine Presumptive Positive (A) Negative    Barbiturates, Urine Negative Negative    Amphetamines, Urine Negative Negative    THC Presumptive Positive (A) Negative    Phencyclidine, Urine Negative Negative    Urine Creatinine 61.7 23.0 - 375.0 mg/dL       RADIOLOGY  CT Abdomen Pelvis  Without Contrast  Result Date: 5/18/2025  EXAM: CT ABDOMEN PELVIS WITHOUT CONTRAST CLINICAL HISTORY:  Flank pain COMPARISON: None available. TECHNIQUE: Axial CT imaging through the abdomen and pelvis performed without intravenous contrast are submitted for interpretation. Multiplanar reformats were performed and interpreted. FINDINGS: The lung bases are clear. No hydronephrosis, urolithiasis, or perinephric stranding. The liver is normal in size and contour. The spleen and  pancreas have a normal non-contrasted appearance. The adrenal glands are within normal limits. The gallbladder is unremarkable. No free intraperitoneal fluid or air.  The bowel is within normal limits.  The appendix is normal. No abdominal lymphadenopathy. Aorta caliber is normal. Pelvis:     The urinary bladder is normal. Moderate degenerative disease at L4-5.     No acute abnormality identified in the abdomen or pelvis. All CT scans at [this location] are performed using dose modulation techniques as appropriate to a performed exam including the following: automated exposure control; adjustment of the mA and/or kV according to patient size (this includes techniques or standardized protocols for targeted exams where dose is matched to indication / reason for exam; i.e. extremities or head); use of iterative reconstruction technique. RADIOLOGIST: JUAN Olivo M.D. Finalized on: 5/18/2025 8:29 PM By:  JUAN Olivo MD, MD [facility]# [id number]      2025-05-18 20:31:[id number]     [facility]    Echo  Result Date: 5/15/2025    Left Ventricle: The left ventricle is normal in size. Mildly increased wall thickness. There is mild concentric hypertrophy. There is normal systolic function with a visually estimated ejection fraction of 60 - 65%. There is normal diastolic function.   Right Ventricle: The right ventricle is normal in size Wall thickness is normal. Systolic function is normal.   Mitral Valve: There is mild regurgitation.   Pulmonary Artery: Pulmonary artery pressure could not be estimated.   IVC/SVC: Normal venous pressure at 3 mmHg.     Cardiac catheterization  Result Date: 5/14/2025    The ejection fraction was calculated to be 65%.   The ejection fraction was greater than 55% by visual estimate.   The pre-procedure left ventricular end diastolic pressure was 15.   The post-procedure left ventricular end diastolic pressure was 19.   The estimated blood loss was none.   There was non-obstructive coronary artery disease..    There was diastolic dysfunction.   The filling pressures on the left were mildly elevated.   Agressive HTN control The procedure log was documented by Documenter: Josie Polanco RN and verified by Christ Butts MD. Date: 5/14/2025  Time: 9:34 PM     X-Ray Chest PA And Lateral  No focal consolidation or effusion.      EKG    MICROBIOLOGY    [facility]    Assessment/Plan:     Assessment & Plan  Right groin pain    Right flank pain    Gross hematuria  Patient acute onset right flank pain, right groin pain, and gross hematuria which began earlier today.  Patient recently discharged on 5/16/25 had to patient underwent left heart catheterization for presumed STEMI and was noted to have nonobstructive CAD.  Patient discharged home on aspirin 81 mg daily, Lipitor 20 mg daily, losartan 100 mg daily, and Coreg 25 mg b.i.d. Patient denied endorsing any new trauma and continues to have gross hematuria without passage of blood clots. Initial workup in the ED revealed patient to be afebrile without leukocytosis, hypertensive, CBC and CMP near baseline with the exception of creatinine/GFR measuring 1.6/56.  UA positive for 3+ blood, 55 RBCs, 6 WBCs.  Urine toxicology positive for opiates and THC.  CT abdomen/pelvis negative for acute findings.  Cardiology consulted by ED staff who recommended obtaining ultrasound of soft tissue of right groin, kidney, and CTA chest/abdomen/pelvis inpatient will be evaluated in the morning.  Patient admitted to Hospital Medicine under observation for continued medical management while awaiting further evaluation/recommendations from Cardiology.    CT Abdomen/Pelvis  FINDINGS:   -The lung bases are clear.   -No hydronephrosis, urolithiasis, or perinephric stranding.   -The liver is normal in size and contour.   -The spleen and pancreas have a normal non-contrasted appearance.   -The adrenal glands are within normal limits.   -The gallbladder is unremarkable.   -No free intraperitoneal fluid or air.   The bowel is within normal limits.  The appendix is normal. No abdominal lymphadenopathy.   -Aorta caliber is normal.     Plan:  -NPO  -Telemetry  -Continue current pain regimen, titrate as needed  -Bowel regimen  -Bedrest  -Wound care   -None weightbearing  -Monitor H/H  -Continue IVFs prn  -Antiemetics prn  -Tylenol as needed for fever   -Hold antiplatelet/anticoagulation therapies in setting of hematuria  -f/u ultrasound of soft tissue of right groin, kidney, and CTA chest/abdomen/pelvis  -f/u cardiology    Hypertension  Chronic, uncontrolled.  Latest blood pressure and vitals reviewed-   Temp:  [98.3 °F (36.8 °C)-99.6 °F (37.6 °C)]   Pulse:  [58-79]   Resp:  [16-20]   BP: (148-186)/()   SpO2:  [95 %-98 %] .   Home meds for hypertension were reviewed and noted below.   Hypertension Medications              carvediloL (COREG) 25 MG tablet Take 1 tablet (25 mg total) by mouth 2 (two) times daily.    losartan (COZAAR) 100 MG tablet Take 1 tablet (100 mg total) by mouth once daily.     While in the hospital, will manage blood pressure as follows; Continue home antihypertensive regimen    Will utilize p.r.n. blood pressure medication only if patient's blood pressure greater than  180/110 and he develops symptoms such as worsening chest pain or shortness of breath.    CAD (coronary artery disease)  Patient with known CAD s/p LHC showing non-obstructive disease, which is controlled Will continue statin but hold antiplatelet/anticoagulation and monitor for S/Sx of angina/ACS. Continue to monitor on telemetry.     Class 1 obesity due to excess calories with serious comorbidity and body mass index (BMI) of 30.0 to 30.9 in adult  Body mass index is 30.6 kg/m². Morbid obesity complicates all aspects of disease management from diagnostic modalities to treatment. Weight loss encouraged and health benefits explained to patient.       VTE Risk Mitigation (From admission, onward)           Ordered     Reason for No  Pharmacological VTE Prophylaxis  Once        Question Answer Comment   Reasons: Active Bleeding    Reasons: Risk of Bleeding        05/19/25 0345     IP VTE HIGH RISK PATIENT  Once         05/19/25 0345     Place sequential compression device  Until discontinued         05/19/25 0345                  //Core Measures   -DVT proph: SCDs, withholding anticoagulation in setting of gross hematuria   -Code status: Full    -Surrogate: none provided       Components of this note were documented using a voice recognition system and are subject to errors not corrected at the time the document was proof read. Please contact the author for any clarifications.       On 05/19/2025, patient should be placed in hospital observation services under my care.       Juan A Villalobos MD  Department of Hospital Medicine  [facility] - Telemetry ([facility])

## 2025-05-19 NOTE — PLAN OF CARE
[facility] - Telemetry (Hospital)  Discharge Assessment    Primary Care Provider: Rachele Peter     Discharge Assessment (most recent)       BRIEF DISCHARGE ASSESSMENT - 05/19/25 1125          Discharge Planning    Assessment Type Discharge Planning Brief Assessment     Resource/Environmental Concerns none     Support Systems Spouse/significant other     Equipment Currently Used at Home none     Current Living Arrangements home     Patient/Family Anticipates Transition to home with family     Patient/Family Anticipated Services at Transition none     DME Needed Upon Discharge  none     Discharge Plan A Home with family                   Patient lives with spouse and 16 year old son. Patient is independent with ADLs.  SW will continue to follow and assist as needed.

## 2025-05-19 NOTE — ASSESSMENT & PLAN NOTE
Patient acute onset right flank pain, right groin pain, and gross hematuria which began earlier today.  Patient recently discharged on 5/16/25 had to patient underwent left heart catheterization for presumed STEMI and was noted to have nonobstructive CAD.  Patient discharged home on aspirin 81 mg daily, Lipitor 20 mg daily, losartan 100 mg daily, and Coreg 25 mg b.i.d. Patient denied endorsing any new trauma and continues to have gross hematuria without passage of blood clots. Initial workup in the ED revealed patient to be afebrile without leukocytosis, hypertensive, CBC and CMP near baseline with the exception of creatinine/GFR measuring 1.6/56.  UA positive for 3+ blood, 55 RBCs, 6 WBCs.  Urine toxicology positive for opiates and THC.  CT abdomen/pelvis negative for acute findings.  Cardiology consulted by ED staff who recommended obtaining ultrasound of soft tissue of right groin, kidney, and CTA chest/abdomen/pelvis inpatient will be evaluated in the morning.  Patient admitted to Hospital Medicine under observation for continued medical management while awaiting further evaluation/recommendations from Cardiology.    CT Abdomen/Pelvis  FINDINGS:   -The lung bases are clear.   -No hydronephrosis, urolithiasis, or perinephric stranding.   -The liver is normal in size and contour.   -The spleen and pancreas have a normal non-contrasted appearance.   -The adrenal glands are within normal limits.   -The gallbladder is unremarkable.   -No free intraperitoneal fluid or air.  The bowel is within normal limits.  The appendix is normal. No abdominal lymphadenopathy.   -Aorta caliber is normal.     Plan:  -NPO  -Telemetry  -Continue current pain regimen, titrate as needed  -Bowel regimen  -Bedrest  -Wound care   -None weightbearing  -Monitor H/H  -Continue IVFs prn  -Antiemetics prn  -Tylenol as needed for fever   -Hold antiplatelet/anticoagulation therapies in setting of hematuria  -f/u ultrasound of soft tissue of right  groin, kidney, and CTA chest/abdomen/pelvis  -f/u cardiology

## 2025-05-19 NOTE — HPI
40 y.o. male with a PMH has a past medical history of Hypertension  presents to the ED for acute onset right flank pain, right groin pain, and gross hematuria which began earlier today. Patient recently discharged on 5/16/25 had to patient underwent left heart catheterization for presumed STEMI and was noted to have nonobstructive CAD. EKG with no changes.Soft tissue R groin us normal.CT of abdomen normal except Small high density linear focus right common femoral renal use possibly small catheter fragment. There is surrounding soft tissue swelling without focal pseudoaneurysm definitely identified. This could also represent closure device material.

## 2025-05-19 NOTE — PLAN OF CARE
A220/A220 STIVEN Bloom is a 40 y.o.male admitted on 5/18/2025 for Right groin pain   Code Status: Full Code MRN: [id number]   Review of patient's allergies indicates:  No Known Allergies  Past Medical History:   Diagnosis Date    Hypertension     Murmur, cardiac       PRN meds    acetaminophen, 650 mg, Q6H PRN  acetaminophen, 650 mg, Q8H PRN  albuterol-ipratropium, 3 mL, Q6H PRN  aluminum-magnesium hydroxide-simethicone, 30 mL, QID PRN  cloNIDine, 0.1 mg, Q6H PRN  dextrose 50%, 12.5 g, PRN  dextrose 50%, 25 g, PRN  glucagon (human recombinant), 1 mg, PRN  glucose, 16 g, PRN  glucose, 24 g, PRN  hydrALAZINE, 10 mg, Q6H PRN  HYDROcodone-acetaminophen, 1 tablet, Q6H PRN  melatonin, 6 mg, Nightly PRN  morphine, 2 mg, Q4H PRN  naloxone, 0.02 mg, PRN  ondansetron, 4 mg, Q8H PRN  promethazine, 25 mg, Q6H PRN  senna-docusate, 1 tablet, BID PRN  sodium chloride 0.9%, 10 mL, Q12H PRN      Virtual RN reviewed discharge instructions with pt.  IV d/c'd with tip intact, pressure dressing applied.  Pt ambulated to personal transportation to be discharged home.      Orientation: oriented x 4  Milvia Coma Scale Score: 15     Lead Monitored: Lead II Rhythm: sinus bradycardia    Cardiac/Telemetry Box Number: [id number]  VTE Core Measure: Pharmacological prophylaxis initiated/maintained Last Bowel Movement: 05/18/25  Diet Heart Healthy     Robi Score: 20  Fall Risk Score: 7  Accucheck []   Freq?      Lines/Drains/Airways       None

## 2025-05-24 PROBLEM — N17.9 AKI (ACUTE KIDNEY INJURY): Status: ACTIVE | Noted: 2025-05-24

## 2025-05-25 NOTE — HOSPITAL COURSE
"Admitted to Hospital Medicine for evaluation of concerns for groin/flank pain, hematuria suspected to be secondary to post left heart catheterization complications.  Right groin ultrasound noted "Small linear focus in the right common femoral artery possibly catheter fragment or closure device material.  Correlation is advised. No pseudoaneurysm or hematoma. "CTA chest/abdomen/pelvis noted "Small high density linear focus right common femoral renal use possibly small catheter fragment.  There is surrounding soft tissue swelling without focal pseudoaneurysm definitely identified.  This could also represent closure device material." Renal ultrasound negative for hydronephrosis or obstructive uropathy.  Cardiology evaluated the patient and evaluation noted pain likely secondary from inflammatory reasons/bruising from right femoral nerve.  Recommendations for NSAIDs/Toradol.  Hospitalization course also notable for elevated creatinine on admission concerning for ROBERTH.  Improvement in renal function noted and resolution of hematuria reported after IV fluid resuscitation.  Home losartan was discontinued due to ROBERTH concerns and switched to amlodipine.  Patient was counseled on follow up with PCP/Cardiology for evaluation of resumption of losartan after repeat evaluation of renal function.  Limited supply of hydrocodone-acetaminophen provided for pain management and patient extensively counseled on safety precautions/adverse effects.    Discharge plan of care was discussed at length with patient including patient's need for close outpatient follow-up. Outpatient follow-ups were scheduled, or if unable to be scheduled ambulatory referrals were placed. Instructed on close outpatient follow up with Cardiology that has already been set up. Instructed on PCP follow up within 1 week with repeat lab work to ensure normalization, repeat urinalysis to ensure resolution of hematuria, follow up of pending labs, or any further " evaluation as necessitated. Counseled on frequent home BP monitoring given adjustments to home BP meds. All questions and concerns were answered. Return precautions provided.  Patient instructed to return to the hospital or seek medical care if baseline status should suddenly change, return of symptoms occurs, or for any new or concerning symptoms that arise.  Patient was in agreement with plan of care going forward. Patient continued to remain afebrile, hemodynamically stable, able to tolerate diet, and ambulate without any significant concerns. Patient seen and examined on the day of discharge. Patient deemed stable for discharge.

## 2025-05-25 NOTE — DISCHARGE SUMMARY
[facility]  Discharge Summary      Patient Name: Jose Bloom  MRN: [id number]  [facility]: [id number]  Patient Class: OP- Observation  Admission Date: 5/18/2025  Hospital Length of Stay: 0 days  Discharge Date and Time: 5/19/2025  6:07 PM  Attending Physician: Marianne att. providers found   Discharging Provider: Blake Killian DO  Primary Care Provider: Rachele Peter    Primary Care Team: Networked reference to record PCT     HPI:   Jose Bloom is a 40 y.o. male with a PMH  has a past medical history of Hypertension and Murmur, cardiac. who presented to the ED for further evaluation of acute onset right flank pain, right groin pain, and gross hematuria which began earlier today.  Patient recently discharged on 5/16/25 had to patient underwent left heart catheterization for presumed STEMI and was noted to have nonobstructive CAD.  Patient discharged home on aspirin 81 mg daily, Lipitor 20 mg daily, losartan 100 mg daily, and Coreg 25 mg b.i.d. and reported compliance with home medications as well as dietary/fluid restrictions and was doing well prior to onset of symptoms.  Patient denied endorsing any new trauma and described endorsing right flank pain as throbbing/stabbing in nature, constant, rated 10/10 in severity at its worst but currently 3/5 following administration of pain medication in the ED with no known alleviating or aggravating factors noted.  Pain was noted to radiate down to his right groin and lower abdomen with no known alleviating factors and aggravating factors including palpation to the affected area.  He denied use of any other antiplatelet/anticoagulation therapies following hospital discharge, denied endorsing any urinary retention, and continues to have gross hematuria without passage of blood clots, and nonbloody bowel movements x2 prior to admission.  Prior to onset of symptoms, patient reported being in his usual state of health with no  "other concerns or complaints.  All other review of systems negative except as noted above.  Initial workup in the ED revealed patient to be afebrile without leukocytosis, hypertensive, CBC and CMP near baseline with the exception of creatinine/GFR measuring 1.6/56.  UA positive for 3+ blood, 55 RBCs, 6 WBCs.  Urine toxicology positive for opiates and THC.  CT abdomen/pelvis negative for acute findings.  Cardiology consulted by ED staff who recommended obtaining ultrasound of soft tissue of right groin, kidney, and CTA chest/abdomen/pelvis inpatient will be evaluated in the morning.  Patient admitted to Hospital Medicine under observation for continued medical management while awaiting further evaluation/recommendations from Cardiology.    PCP: [place], Provider      * No surgery found *      Hospital Course:   Admitted to Hospital Medicine for evaluation of concerns for groin/flank pain, hematuria suspected to be secondary to post left heart catheterization complications.  Right groin ultrasound noted "Small linear focus in the right common femoral artery possibly catheter fragment or closure device material.  Correlation is advised. No pseudoaneurysm or hematoma. "CTA chest/abdomen/pelvis noted "Small high density linear focus right common femoral renal use possibly small catheter fragment.  There is surrounding soft tissue swelling without focal pseudoaneurysm definitely identified.  This could also represent closure device material." Renal ultrasound negative for hydronephrosis or obstructive uropathy.  Cardiology evaluated the patient and evaluation noted pain likely secondary from inflammatory reasons/bruising from right femoral nerve.  Recommendations for NSAIDs/Toradol.  Hospitalization course also notable for elevated creatinine on admission concerning for ROBERTH.  Improvement in renal function noted and resolution of hematuria reported after IV fluid resuscitation.  Home losartan was discontinued due to " ROBERTH concerns and switched to amlodipine.  Patient was counseled on follow up with PCP/Cardiology for evaluation of resumption of losartan after repeat evaluation of renal function.  Limited supply of hydrocodone-acetaminophen provided for pain management and patient extensively counseled on safety precautions/adverse effects.    Discharge plan of care was discussed at length with patient including patient's need for close outpatient follow-up. Outpatient follow-ups were scheduled, or if unable to be scheduled ambulatory referrals were placed. Instructed on close outpatient follow up with Cardiology that has already been set up. Instructed on PCP follow up within 1 week with repeat lab work to ensure normalization, repeat urinalysis to ensure resolution of hematuria, follow up of pending labs, or any further evaluation as necessitated. Counseled on frequent home BP monitoring given adjustments to home BP meds. All questions and concerns were answered. Return precautions provided.  Patient instructed to return to the hospital or seek medical care if baseline status should suddenly change, return of symptoms occurs, or for any new or concerning symptoms that arise.  Patient was in agreement with plan of care going forward. Patient continued to remain afebrile, hemodynamically stable, able to tolerate diet, and ambulate without any significant concerns. Patient seen and examined on the day of discharge. Patient deemed stable for discharge.      Goals of Care Treatment Preferences:  Code Status: Full Code         Consults:   Consults (From admission, onward)          Status Ordering Provider     Inpatient consult to Cardiology  Once        Provider:  Atif Simon MD    Completed KARISHMA NOEL            Final Active Diagnoses:    Diagnosis Date Noted POA    PRINCIPAL PROBLEM:  Right groin pain [R10.31] 05/19/2025 Yes    ROBERTH (acute kidney injury) [N17.9] 05/24/2025 Yes    Gross hematuria [R31.0] 05/19/2025  Yes    Hypertension [I10] 05/19/2025 Yes     Chronic    CAD (coronary artery disease) [I25.10] 05/19/2025 Yes     Chronic    Class 1 obesity due to excess calories with serious comorbidity and body mass index (BMI) of 30.0 to 30.9 in adult [E66.811, E66.09, Z68.30] 05/19/2025 Not Applicable    Right flank pain [R10.9] 05/19/2025 Yes      Problems Resolved During this Admission:       Discharged Condition: stable    Disposition: Home or Self Care    Follow Up:   Follow-up Information       [facility]. Schedule an appointment as soon as possible for a visit.    Specialty: Internal Medicine  Why: We have placed a referral to Internal Medicine.  Please give our scheduling line a call at [phone] to schedule an appointment with them if it has not been setup already.  It is extremely important for you to follow up with a healthcare provider to make sure your urinalysis and renal function normalizes and there is no recurrence of hematuria.  We also made adjustments to your home antihypertensives due to your abnormal renal function. Please make sure to follow up with a healthcare provider as they can reassess your home antihypertensive regimen. Please make sure to frequently monitor your home BP, if it remains elevated please reach out to your healthcare provider for resumption.  Maintain a BP log and take it to your next healthcare provider visit within 1 week. Your labs remained stable, but some labs still remained abnormal. As we discussed, it is extremely important for you to followup with a primary care physician within 1 week for repeat labs.  Contact information:  [address] [place] [address] Dr Juanita Terrazas [place] [address] [phone]  Additional information:  Please take [address] for the Medical Office Building. Check in on the 1st floor.             [facility]. Go to.    Specialty: Cardiology  Why: Please make sure to attend your upcoming cardiology appointment as they can further  evaluate your concerns.  Contact information:  [address] [place] [address] Dr Juanita [place] [address] [phone]  Additional information:  Please take [address] to the Medical Office Building. Check in on the 4th floor.                         Patient Instructions:      Notify your health care provider if you experience any of the following:  temperature >100.4     Notify your health care provider if you experience any of the following:  persistent nausea and vomiting or diarrhea     Notify your health care provider if you experience any of the following:  severe uncontrolled pain     Notify your health care provider if you experience any of the following:  redness, tenderness, or signs of infection (pain, swelling, redness, odor or green/yellow discharge around incision site)     Notify your health care provider if you experience any of the following:  difficulty breathing or increased cough     Notify your health care provider if you experience any of the following:  severe persistent headache     Notify your health care provider if you experience any of the following:  persistent dizziness, light-headedness, or visual disturbances     Notify your health care provider if you experience any of the following:  worsening rash     Notify your health care provider if you experience any of the following:  increased confusion or weakness     Notify your health care provider if you experience any of the following:   Order Comments: Diagnosis: Worsening renal function - alarm signs  Please return to the ED emergently if you experience any of the following: a significant decrease in urine output (e.g., less than 400 mL in 24 hours), complete inability to urinate, marked swelling of the legs, ankles, or face, new or worsening shortness of breath, chest pain or tightness, persistent nausea or vomiting interfering with oral intake, confusion or altered mental status, or any signs of uremia such as a metallic taste in  the mouth, persistent itching, or unexplained fatigue.    Diagnosis: Right femoral vascular access site - heart catheterization complications  Please return to the ED emergently if you experience any of the following: increasing pain, swelling, or a growing lump at the groin access site; persistent or worsening bleeding or oozing from the puncture site; development of a cold, pale, numb, or weak leg on the side of the procedure (suggesting compromised circulation); sudden onset of back or abdominal pain (which may indicate retroperitoneal bleeding); dizziness, fainting, or rapid heartbeat; or any signs of infection at the site such as redness, warmth, discharge, or fever.    Diagnosis:  Hydrocodone-acetaminophen adverse effects  Please return to the ED emergently if you experience any of the following: severe drowsiness or difficulty staying awake, confusion or unusual changes in mental status, shallow or slowed breathing, difficulty breathing or shortness of breath, bluish lips or fingernails (cyanosis), chest pain, lightheadedness or fainting, signs of a severe allergic reaction such as facial swelling, hives, or throat tightness, severe nausea or vomiting preventing fluid or food intake, dark urine, yellowing of the skin or eyes (jaundice), itching or right upper abdominal pain (which may suggest liver injury), or any other sudden or concerning symptoms that may indicate a serious reaction to the medication.       Significant Labs:   Recent Labs   Lab 05/18/25 1843 05/19/25 0521 05/19/25  1335    143 140   K 3.6 3.9 3.5    112* 107   CO2 22* 21* 22*   BUN 16 17 14   CREATININE 1.6* 1.6* 1.4   GLU 96 109 103   ANIONGAP 11 10 11     Recent Labs   Lab 05/18/25 1843 05/19/25 0521   AST 25 22   ALT 42 34   ALKPHOS 61 47   BILITOT 0.5 0.4   ALBUMIN 4.3 3.6    Recent Labs   Lab 05/18/25 1843 05/19/25 0521   WBC 6.07 6.63   HGB 13.9* 14.0   HCT 40.1 41.6    258                  Significant  Imaging:   CTA Chest Abdomen Pelvis (XPD)   Final Result   Abnormal      No aortic dissection or aneurysm.      Small high density linear focus right common femoral renal use possibly small catheter fragment.  There is surrounding soft tissue swelling without focal pseudoaneurysm definitely identified.  This could also represent closure device material.      Bilateral perinephric fat stranding concerning for noninfectious inflammation or infection.  Correlation is advised.      Trace pericardial effusion.      This report was flagged in Epic as abnormal.  The preliminary and final reports are concordant.      All CT scans at this facility are performed  using dose modulation techniques as appropriate to performed exam including the following:  automated exposure control; adjustment of mA and/or kV according to the patients size (this includes techniques or standardized protocols for targeted exams where dose is matched to indication/reason for exam: i.e. extremities or head);  iterative reconstruction technique.         Electronically signed by: Nguyễn Fortune   Date:    05/19/2025   Time:    08:41      US Soft Tissue, Groin Right   Final Result   Abnormal      As above.      This report was flagged in Epic as abnormal.  The preliminary and final reports are concordant.         Electronically signed by: Nguyễn Fortune   Date:    05/19/2025   Time:    08:42      US Kidney   Final Result      No hydronephrosis or obstructive uropathy.      Minimal free fluid.      The preliminary and final reports are concordant.         Electronically signed by: Nguyễn Fortune   Date:    05/19/2025   Time:    08:44      CT Abdomen Pelvis  Without Contrast   Final Result      No acute abnormality identified in the abdomen or pelvis.            All CT scans at [this location] are performed using dose modulation techniques as appropriate to a performed exam including the following: automated exposure control; adjustment of the mA and/or kV  according to patient size (this includes techniques or standardized protocols for targeted exams where dose is matched to indication / reason for exam; i.e. extremities or head); use of iterative reconstruction technique.      RADIOLOGIST:   JUAN Olivo M.D.      Finalized on: 5/18/2025 8:29 PM By:  JUAN Olivo MD, MD   [facility]# [id number]      2025-05-18 20:31:[id number]     [facility]                Pending Diagnostic Studies:       None           Medications:  Reconciled Home Medications:      Medication List        PAUSE taking these medications      losartan 100 MG tablet  Wait to take this until your doctor or other care provider tells you to start again.  HELD DUE TO ABNORMAL RENAL FUNCTION. HOLD UNTIL FOLLOWUP WITH CARDIOLOGY OR PCP FOR REEVALUATION FOR RESUMPTION.   Commonly known as: COZAAR  Take 1 tablet (100 mg total) by mouth once daily.            START taking these medications      amLODIPine 5 MG tablet  Commonly known as: NORVASC  Take 1 tablet (5 mg total) by mouth once daily.     HYDROcodone-acetaminophen 5-325 mg per tablet  Commonly known as: NORCO  Take 1 tablet by mouth every 12 (twelve) hours as needed for Pain.            CONTINUE taking these medications      aspirin 81 MG EC tablet  Commonly known as: ECOTRIN  Take 1 tablet (81 mg total) by mouth once daily.     atorvastatin 20 MG tablet  Commonly known as: LIPITOR  Take 1 tablet (20 mg total) by mouth every evening.     carvediloL 25 MG tablet  Commonly known as: COREG  Take 1 tablet (25 mg total) by mouth 2 (two) times daily.              Indwelling Lines/Drains at time of discharge:   Lines/Drains/Airways       None                   Time spent on the discharge of patient: 45 minutes         Blake Killian DO  Department of Hospital Medicine  [facility] - Telemetry ([facility])    Voice recognition software was used in the creation of this note/communication and any sound-alike/typographical errors which may have occurred despite initial review  prior to signing should be taken in context when interpreting.  If such errors prevent a clear understanding of the note/communication, please contact the provider/office for clarification.

## 2025-05-27 NOTE — PHYSICIAN QUERY
Question: Please clarify the Cardiac diagnosis.    Provider Query Response:  STEMI - inferolateral

## 2025-05-29 NOTE — PROGRESS NOTES
Subjective:   Patient ID:  Jose Bloom is a 40 y.o. male who presents for hospital follow up. His current medical conditions include CAD, HTN, and former tobacco use.       Hospital Course:   39 y/o male with PMHx of HTN and tobacco use who presented to the ED on 5/14/25 with chest pain X3 days with associated SOB, LH and nausea. EKG performed with ST elevation. Noted to be in distress and taken to cath lab for LHC. Heart cath revealed non obstructive CAD.     5/15/2025-Patient seen and examined today, s/p LHC which showed non-obs CAD. Feels ok. Reported some mild CP/SOB overnight, improved. Off Cardene drip. Meds adjusted. Labs reviewed.      5/16/2025  Pt seen and examined today. Resting in bed with no acute distress. No acute events overnight. Adjustments made to medicines. Discussed medication compliance and smoking cessation. Discharge instructions and restrictions discussed. No cardiac symptoms at this time. He has ambulated with no issues. Labs reviewed. Access site remains stable and CDI.     5/30/2025  Patient presents for hospital follow up. He is overall doing well from a CV standpoint. He has started taking two amlodipine 5 mg a day as his blood pressure was not controlled on the amlodipine 5 mg once daily. This and coreg has controlled his blood pressures. He has had no further chest pain, shortness of breath, palpitations, pre-syncope, syncope, orthopnea, PND or leg swelling.   R groin access site healing well.   He has stopped smoking cigarettes and has completely changed his diet.   He has noticed a cough that occurs mostly at night and is productive. He also notes some wheezing at night. No fever.   He is tolerating his medication well    Echo 5/25 - EF 60-65%, normal diastolic dysfunction, mild LVH, mild MR  Cath 5/25 - non-obs CAD, diastolic dysfunction        Past Medical History:   Diagnosis Date    Hypertension     Murmur, cardiac        Past Surgical History:   Procedure  Laterality Date    CORONARY ANGIOGRAPHY N/A 5/14/2025    Procedure: ANGIOGRAM, CORONARY ARTERY;  Surgeon: Christ Butts MD;  Location: [facility] CATH LAB;  Service: Cardiology;  Laterality: N/A;    LEFT HEART CATHETERIZATION Left 5/14/2025    Procedure: Left heart cath;  Surgeon: Christ Butts MD;  Location: [facility] CATH LAB;  Service: Cardiology;  Laterality: Left;    VENTRICULOGRAM, LEFT  5/14/2025    Procedure: Ventriculogram, Left;  Surgeon: Christ Butts MD;  Location: [facility] CATH LAB;  Service: Cardiology;;       Social History[1]    Family History   Problem Relation Name Age of Onset    Hypertension Mother      Hypertension Father         Wt Readings from Last 3 Encounters:   05/30/25 106.4 kg (234 lb 9.1 oz)   05/19/25 105.2 kg (231 lb 14.8 oz)   05/15/25 108 kg (238 lb)     Temp Readings from Last 3 Encounters:   05/19/25 97.7 °F (36.5 °C) (Oral)   05/16/25 98.1 °F (36.7 °C) (Oral)   08/23/23 98.5 °F (36.9 °C) (Oral)     BP Readings from Last 3 Encounters:   05/30/25 120/70   05/19/25 (!) 144/92   05/16/25 (!) 144/90     Pulse Readings from Last 3 Encounters:   05/30/25 76   05/19/25 (!) 59   05/16/25 65       Medications Ordered Prior to Encounter[2]    No cardiac monitor results found for the past 12 months    Results for orders placed during the hospital encounter of 05/14/25    Echo    Interpretation Summary    Left Ventricle: The left ventricle is normal in size. Mildly increased wall thickness. There is mild concentric hypertrophy. There is normal systolic function with a visually estimated ejection fraction of 60 - 65%. There is normal diastolic function.    Right Ventricle: The right ventricle is normal in size Wall thickness is normal. Systolic function is normal.    Mitral Valve: There is mild regurgitation.    Pulmonary Artery: Pulmonary artery pressure could not be estimated.    IVC/SVC: Normal venous pressure at 3 mmHg.    No results found for this or any previous visit.        Results for  orders placed or performed during the hospital encounter of 05/18/25   EKG 12-lead    Collection Time: 05/18/25  5:26 PM   Result Value Ref Range    QRS Duration 86 ms    [facility] QTC Calculation 405 ms    Narrative    Test Reason : I10,    Vent. Rate :  79 BPM     Atrial Rate :  79 BPM     P-R Int : 138 ms          QRS Dur :  86 ms      QT Int : 354 ms       P-R-T Axes :  36  76  31 degrees    QTcB Int : 405 ms    Normal sinus rhythm  Normal ECG  When compared with ECG of 14-May-2025 20:39,  ST no longer elevated in Inferior leads  T wave inversion now evident in Inferior leads  Confirmed by Sammy Juarez ([id number]) on 5/19/2025 12:21:57 PM    Referred By: AAAREFERRAL SELF           Confirmed By: Sammy Juarez         Review of Systems   Constitutional: Negative.   HENT: Negative.     Eyes: Negative.    Cardiovascular: Negative.  Negative for chest pain, dyspnea on exertion, irregular heartbeat, leg swelling, near-syncope, orthopnea, palpitations, paroxysmal nocturnal dyspnea and syncope.   Respiratory:  Positive for cough and wheezing.    Skin: Negative.    Musculoskeletal: Negative.    Gastrointestinal: Negative.    Genitourinary: Negative.    Neurological: Negative.    Psychiatric/Behavioral: Negative.           Physical Exam  Vitals and nursing note reviewed.   Constitutional:       Appearance: Normal appearance.   HENT:      Head: Normocephalic and atraumatic.   Eyes:      General:         Right eye: No discharge.         Left eye: No discharge.      Pupils: Pupils are equal, round, and reactive to light.   Cardiovascular:      Rate and Rhythm: Normal rate and regular rhythm.      Heart sounds: S1 normal and S2 normal. No murmur heard.     No friction rub.   Pulmonary:      Effort: Pulmonary effort is normal. No respiratory distress.      Breath sounds: Normal breath sounds. No rales.   Abdominal:      Palpations: Abdomen is soft.      Tenderness: There is no abdominal tenderness.   Musculoskeletal:      Cervical back:  Neck supple.      Right lower leg: No edema.      Left lower leg: No edema.   Skin:     General: Skin is warm and dry.   Neurological:      General: No focal deficit present.      Mental Status: He is alert and oriented to person, place, and time.   Psychiatric:         Mood and Affect: Mood normal.         Behavior: Behavior normal.         Thought Content: Thought content normal.         Lab Results   Component Value Date    CHOL 174 05/15/2025    CHOL 201 (H) 10/07/2024    CHOL 219 (H) 09/01/2023     Lab Results   Component Value Date    HDL 37 (L) 05/15/2025    HDL 45 10/07/2024    HDL 47 09/01/2023     Lab Results   Component Value Date    LDLCALC 110.8 05/15/2025    LDLCALC 131 (H) 10/07/2024    LDLCALC 150 (H) 09/01/2023     Lab Results   Component Value Date    TRIG 131 05/15/2025    TRIG 142 10/07/2024    TRIG 122 09/01/2023     Lab Results   Component Value Date    CHOLHDL 21.3 05/15/2025       Chemistry        Component Value Date/Time     05/19/2025 1335     04/25/2024 1148     08/23/2023 1122    K 3.5 05/19/2025 1335    K 3.7 04/25/2024 1148    K 4.1 08/23/2023 1122     05/19/2025 1335     04/25/2024 1148     08/23/2023 1122    CO2 22 (L) 05/19/2025 1335    CO2 26 04/25/2024 1148    CO2 25 08/23/2023 1122    BUN 14 05/19/2025 1335    CREATININE 1.4 05/19/2025 1335     05/19/2025 1335    GLU 77 08/23/2023 1122        Component Value Date/Time    CALCIUM 9.1 05/19/2025 1335    CALCIUM 10 04/25/2024 1148    CALCIUM 9.7 08/23/2023 1122    ALKPHOS 47 05/19/2025 0521    ALKPHOS 56 08/23/2023 1122    AST 22 05/19/2025 0521    AST 32 08/23/2023 1122    ALT 34 05/19/2025 0521    ALT 34 08/23/2023 1122    BILITOT 0.4 05/19/2025 0521    BILITOT 0.9 08/23/2023 1122    ESTGFRAFRICA 115 04/25/2024 1148          Lab Results   Component Value Date    TSH 0.221 (L) 10/07/2024     Lab Results   Component Value Date    INR 0.9 05/14/2025    PROTIME 10.8 05/14/2025     Lab Results    Component Value Date    WBC 6.63 05/19/2025    HGB 14.0 05/19/2025    HCT 41.6 05/19/2025    MCV 83 05/19/2025     05/19/2025            Assessment:      1. Coronary artery disease, unspecified vessel or lesion type, unspecified whether angina present, unspecified whether native or transplanted heart    2. Hypertension, unspecified type    3. Acute cough    4. Former smoker        Plan:   Coronary artery disease, unspecified vessel or lesion type, unspecified whether angina present, unspecified whether native or transplanted heart    Hypertension, unspecified type  -     amLODIPine (NORVASC) 10 MG tablet; Take 1 tablet (10 mg total) by mouth once daily.  Dispense: 90 tablet; Refill: 3    Acute cough  -     X-Ray Chest PA And Lateral; Future; Expected date: 05/30/2025    Former smoker      Increased amlodipine to 10 mg as patient has been taking this at home with controlled blood pressures  Chest xray for cough and sputum production  Encouraged patient to set up appt with primary care  Continue asa, atorvastatin, coreg - CAD  Continue amlodipine, coreg - HTN  RTC 8 weeks with Dr. Beckie Cabral, PA-C  [facility]         [1]   Social History  Tobacco Use    Smoking status: Some Days     Current packs/day: 0.10     Types: Cigarettes    Smokeless tobacco: Never   Substance Use Topics    Alcohol use: Yes     Comment: depends on the week, at most a couple drinks after work; this past week that has been daily    Drug use: No   [2]   Current Outpatient Medications on File Prior to Visit   Medication Sig Dispense Refill    aspirin (ECOTRIN) 81 MG EC tablet Take 1 tablet (81 mg total) by mouth once daily. 90 tablet 3    atorvastatin (LIPITOR) 20 MG tablet Take 1 tablet (20 mg total) by mouth every evening. 90 tablet 3    carvediloL (COREG) 25 MG tablet Take 1 tablet (25 mg total) by mouth 2 (two) times daily. 180 tablet 3    HYDROcodone-acetaminophen (NORCO) 5-325 mg per tablet Take 1 tablet by mouth  every 12 (twelve) hours as needed for Pain. 10 tablet 0    [Paused] losartan (COZAAR) 100 MG tablet Take 1 tablet (100 mg total) by mouth once daily. 90 tablet 3    [DISCONTINUED] amLODIPine (NORVASC) 5 MG tablet Take 1 tablet (5 mg total) by mouth once daily. 30 tablet 0     No current facility-administered medications on file prior to visit.

## 2025-05-30 ENCOUNTER — HOSPITAL ENCOUNTER (OUTPATIENT)
Dept: RADIOLOGY | Facility: HOSPITAL | Age: 40
Discharge: HOME | End: 2025-05-30
Payer: COMMERCIAL

## 2025-05-30 ENCOUNTER — OFFICE VISIT (OUTPATIENT)
Dept: CARDIOLOGY | Facility: CLINIC | Age: 40
End: 2025-05-30
Payer: COMMERCIAL

## 2025-05-30 VITALS
HEART RATE: 76 BPM | BODY MASS INDEX: 31.09 KG/M2 | DIASTOLIC BLOOD PRESSURE: 70 MMHG | HEIGHT: 73 IN | SYSTOLIC BLOOD PRESSURE: 120 MMHG | WEIGHT: 234.56 LBS | OXYGEN SATURATION: 96 %

## 2025-05-30 DIAGNOSIS — Z87.891 FORMER SMOKER: ICD-10-CM

## 2025-05-30 DIAGNOSIS — R05.1 ACUTE COUGH: ICD-10-CM

## 2025-05-30 DIAGNOSIS — I10 HYPERTENSION, UNSPECIFIED TYPE: Chronic | ICD-10-CM

## 2025-05-30 DIAGNOSIS — I25.10 CORONARY ARTERY DISEASE, UNSPECIFIED VESSEL OR LESION TYPE, UNSPECIFIED WHETHER ANGINA PRESENT, UNSPECIFIED WHETHER NATIVE OR TRANSPLANTED HEART: Primary | Chronic | ICD-10-CM

## 2025-05-30 PROBLEM — R05.9 COUGH: Status: ACTIVE | Noted: 2025-05-30

## 2025-05-30 PROCEDURE — 99999 PR PBB SHADOW E&M-EST. PATIENT-LVL III: CPT | Mod: PBBFAC,,,

## 2025-05-30 PROCEDURE — 71046 X-RAY EXAM CHEST 2 VIEWS: CPT | Mod: TC

## 2025-05-30 PROCEDURE — 71046 X-RAY EXAM CHEST 2 VIEWS: CPT | Mod: 26,,, | Performed by: RADIOLOGY

## 2025-05-30 RX ORDER — AMLODIPINE BESYLATE 10 MG/1
10 TABLET ORAL DAILY
Qty: 90 TABLET | Refills: 3 | Status: SHIPPED | OUTPATIENT
Start: 2025-05-30 | End: 2026-05-30

## 2025-06-01 ENCOUNTER — HOSPITAL ENCOUNTER (EMERGENCY)
Facility: HOSPITAL | Age: 40
Discharge: HOME | End: 2025-06-01
Attending: EMERGENCY MEDICINE
Payer: COMMERCIAL

## 2025-06-01 VITALS
WEIGHT: 235.81 LBS | HEART RATE: 65 BPM | SYSTOLIC BLOOD PRESSURE: 149 MMHG | BODY MASS INDEX: 31.25 KG/M2 | OXYGEN SATURATION: 97 % | DIASTOLIC BLOOD PRESSURE: 87 MMHG | RESPIRATION RATE: 18 BRPM | TEMPERATURE: 99 F | HEIGHT: 73 IN

## 2025-06-01 DIAGNOSIS — S39.012A STRAIN OF LUMBAR REGION, INITIAL ENCOUNTER: Primary | ICD-10-CM

## 2025-06-01 LAB
ABSOLUTE EOSINOPHIL (OHS): 0.28 K/UL
ABSOLUTE MONOCYTE (OHS): 0.61 K/UL (ref 0.3–1)
ABSOLUTE NEUTROPHIL COUNT (OHS): 2.9 K/UL (ref 1.8–7.7)
ALBUMIN SERPL BCP-MCNC: 4.5 G/DL (ref 3.5–5.2)
ALP SERPL-CCNC: 52 UNIT/L (ref 40–150)
ALT SERPL W/O P-5'-P-CCNC: 32 UNIT/L (ref 10–44)
ANION GAP (OHS): 11 MMOL/L (ref 8–16)
AST SERPL-CCNC: 18 UNIT/L (ref 11–45)
BASOPHILS # BLD AUTO: 0.03 K/UL
BASOPHILS NFR BLD AUTO: 0.5 %
BILIRUB SERPL-MCNC: 0.8 MG/DL (ref 0.1–1)
BILIRUB UR QL STRIP.AUTO: NEGATIVE
BUN SERPL-MCNC: 14 MG/DL (ref 6–20)
CALCIUM SERPL-MCNC: 9.8 MG/DL (ref 8.7–10.5)
CHLORIDE SERPL-SCNC: 106 MMOL/L (ref 95–110)
CLARITY UR: CLEAR
CO2 SERPL-SCNC: 23 MMOL/L (ref 23–29)
COLOR UR AUTO: YELLOW
CREAT SERPL-MCNC: 0.9 MG/DL (ref 0.5–1.4)
ERYTHROCYTE [DISTWIDTH] IN BLOOD BY AUTOMATED COUNT: 12.2 % (ref 11.5–14.5)
GFR SERPLBLD CREATININE-BSD FMLA CKD-EPI: >60 ML/MIN/1.73/M2
GLUCOSE SERPL-MCNC: 54 MG/DL (ref 70–110)
GLUCOSE UR QL STRIP: NEGATIVE
HCT VFR BLD AUTO: 38.8 % (ref 40–54)
HGB BLD-MCNC: 13.3 GM/DL (ref 14–18)
HGB UR QL STRIP: NEGATIVE
HOLD SPECIMEN: NORMAL
IMM GRANULOCYTES # BLD AUTO: 0.02 K/UL (ref 0–0.04)
IMM GRANULOCYTES NFR BLD AUTO: 0.3 % (ref 0–0.5)
KETONES UR QL STRIP: NEGATIVE
LEUKOCYTE ESTERASE UR QL STRIP: NEGATIVE
LYMPHOCYTES # BLD AUTO: 2.19 K/UL (ref 1–4.8)
MCH RBC QN AUTO: 28.2 PG (ref 27–31)
MCHC RBC AUTO-ENTMCNC: 34.3 G/DL (ref 32–36)
MCV RBC AUTO: 82 FL (ref 82–98)
NITRITE UR QL STRIP: NEGATIVE
NUCLEATED RBC (/100WBC) (OHS): 0 /100 WBC
PH UR STRIP: 6 [PH]
PLATELET # BLD AUTO: 308 K/UL (ref 150–450)
PMV BLD AUTO: 10.2 FL (ref 9.2–12.9)
POTASSIUM SERPL-SCNC: 3.9 MMOL/L (ref 3.5–5.1)
PROT SERPL-MCNC: 8 GM/DL (ref 6–8.4)
PROT UR QL STRIP: NEGATIVE
RBC # BLD AUTO: 4.71 M/UL (ref 4.6–6.2)
RELATIVE EOSINOPHIL (OHS): 4.6 %
RELATIVE LYMPHOCYTE (OHS): 36.3 % (ref 18–48)
RELATIVE MONOCYTE (OHS): 10.1 % (ref 4–15)
RELATIVE NEUTROPHIL (OHS): 48.2 % (ref 38–73)
SODIUM SERPL-SCNC: 140 MMOL/L (ref 136–145)
SP GR UR STRIP: 1.02
UROBILINOGEN UR STRIP-ACNC: NEGATIVE EU/DL
WBC # BLD AUTO: 6.03 K/UL (ref 3.9–12.7)

## 2025-06-01 PROCEDURE — 81003 URINALYSIS AUTO W/O SCOPE: CPT | Performed by: NURSE PRACTITIONER

## 2025-06-01 PROCEDURE — 25000003 PHARM REV CODE 250: Performed by: NURSE PRACTITIONER

## 2025-06-01 PROCEDURE — 80053 COMPREHEN METABOLIC PANEL: CPT | Performed by: NURSE PRACTITIONER

## 2025-06-01 PROCEDURE — 85025 COMPLETE CBC W/AUTO DIFF WBC: CPT | Performed by: NURSE PRACTITIONER

## 2025-06-01 PROCEDURE — 99284 EMERGENCY DEPT VISIT MOD MDM: CPT | Mod: 25

## 2025-06-01 RX ORDER — HYDROCODONE BITARTRATE AND ACETAMINOPHEN 7.5; 325 MG/1; MG/1
1 TABLET ORAL EVERY 6 HOURS PRN
Qty: 11 TABLET | Refills: 0 | Status: SHIPPED | OUTPATIENT
Start: 2025-06-01 | End: 2025-06-11

## 2025-06-01 RX ORDER — HYDROCODONE BITARTRATE AND ACETAMINOPHEN 10; 325 MG/1; MG/1
1 TABLET ORAL
Refills: 0 | Status: COMPLETED | OUTPATIENT
Start: 2025-06-01 | End: 2025-06-01

## 2025-06-01 RX ADMIN — HYDROCODONE BITARTRATE AND ACETAMINOPHEN 1 TABLET: 10; 325 TABLET ORAL at 01:06

## 2025-06-02 ENCOUNTER — TELEPHONE (OUTPATIENT)
Dept: CARDIOLOGY | Facility: CLINIC | Age: 40
End: 2025-06-02
Payer: COMMERCIAL

## 2025-06-02 ENCOUNTER — RESULTS FOLLOW-UP (OUTPATIENT)
Dept: CARDIOLOGY | Facility: CLINIC | Age: 40
End: 2025-06-02

## 2025-06-04 RX ORDER — DICYCLOMINE HYDROCHLORIDE 20 MG/1
20 TABLET ORAL
COMMUNITY
End: 2025-06-05

## 2025-06-04 RX ORDER — OXYCODONE HYDROCHLORIDE 10 MG/1
TABLET ORAL
COMMUNITY
End: 2025-06-05

## 2025-06-04 RX ORDER — OXYCODONE AND ACETAMINOPHEN 10; 325 MG/1; MG/1
1 TABLET ORAL EVERY 6 HOURS PRN
COMMUNITY
End: 2025-06-05

## 2025-06-04 RX ORDER — INDOMETHACIN 50 MG/1
50 CAPSULE ORAL
COMMUNITY
End: 2025-06-05

## 2025-06-04 RX ORDER — CIPROFLOXACIN 500 MG/1
500 TABLET, FILM COATED ORAL
COMMUNITY
End: 2025-06-05

## 2025-06-04 RX ORDER — IRBESARTAN 300 MG/1
300 TABLET ORAL
COMMUNITY
End: 2025-06-05

## 2025-06-04 RX ORDER — LOSARTAN POTASSIUM 100 MG/1
100 TABLET ORAL EVERY MORNING
COMMUNITY
Start: 2025-04-21 | End: 2025-06-05

## 2025-06-04 RX ORDER — PANTOPRAZOLE SODIUM 40 MG/1
40 TABLET, DELAYED RELEASE ORAL
COMMUNITY

## 2025-06-04 RX ORDER — METRONIDAZOLE 500 MG/1
500 TABLET ORAL
COMMUNITY
End: 2025-06-05

## 2025-06-05 ENCOUNTER — LAB VISIT (OUTPATIENT)
Dept: LAB | Facility: HOSPITAL | Age: 40
End: 2025-06-05
Attending: INTERNAL MEDICINE
Payer: COMMERCIAL

## 2025-06-05 ENCOUNTER — OFFICE VISIT (OUTPATIENT)
Dept: INTERNAL MEDICINE | Facility: CLINIC | Age: 40
End: 2025-06-05
Payer: COMMERCIAL

## 2025-06-05 VITALS
WEIGHT: 238.13 LBS | BODY MASS INDEX: 31.56 KG/M2 | OXYGEN SATURATION: 98 % | TEMPERATURE: 97 F | HEIGHT: 73 IN | HEART RATE: 88 BPM | DIASTOLIC BLOOD PRESSURE: 66 MMHG | SYSTOLIC BLOOD PRESSURE: 134 MMHG

## 2025-06-05 DIAGNOSIS — G89.29 CHRONIC RIGHT-SIDED LOW BACK PAIN WITH RIGHT-SIDED SCIATICA: ICD-10-CM

## 2025-06-05 DIAGNOSIS — Z11.4 SCREENING FOR HIV (HUMAN IMMUNODEFICIENCY VIRUS): ICD-10-CM

## 2025-06-05 DIAGNOSIS — M54.41 CHRONIC RIGHT-SIDED LOW BACK PAIN WITH RIGHT-SIDED SCIATICA: ICD-10-CM

## 2025-06-05 DIAGNOSIS — R20.2 RIGHT HAND PARESTHESIA: ICD-10-CM

## 2025-06-05 DIAGNOSIS — E78.00 HYPERCHOLESTEROLEMIA: ICD-10-CM

## 2025-06-05 DIAGNOSIS — I25.10 CORONARY ARTERY DISEASE INVOLVING NATIVE CORONARY ARTERY OF NATIVE HEART WITHOUT ANGINA PECTORIS: Chronic | ICD-10-CM

## 2025-06-05 DIAGNOSIS — F17.211 CIGARETTE NICOTINE DEPENDENCE IN REMISSION: ICD-10-CM

## 2025-06-05 DIAGNOSIS — I10 ESSENTIAL HYPERTENSION: Primary | ICD-10-CM

## 2025-06-05 PROBLEM — F17.210 TOBACCO SMOKER, LESS THAN 10 CIGARETTES PER DAY: Status: RESOLVED | Noted: 2025-05-14 | Resolved: 2025-06-05

## 2025-06-05 PROBLEM — N17.9 AKI (ACUTE KIDNEY INJURY): Status: RESOLVED | Noted: 2025-05-24 | Resolved: 2025-06-05

## 2025-06-05 PROBLEM — J02.9 SORE THROAT: Status: RESOLVED | Noted: 2025-05-14 | Resolved: 2025-06-05

## 2025-06-05 LAB
EAG (OHS): 117 MG/DL (ref 68–131)
HBA1C MFR BLD: 5.7 % (ref 4–5.6)
HIV 1+2 AB+HIV1 P24 AG SERPL QL IA: NORMAL
VIT B12 SERPL-MCNC: 638 PG/ML (ref 210–950)

## 2025-06-05 PROCEDURE — 82607 VITAMIN B-12: CPT

## 2025-06-05 PROCEDURE — 36415 COLL VENOUS BLD VENIPUNCTURE: CPT

## 2025-06-05 PROCEDURE — 3075F SYST BP GE 130 - 139MM HG: CPT | Mod: CPTII,S$GLB,, | Performed by: INTERNAL MEDICINE

## 2025-06-05 PROCEDURE — 99204 OFFICE O/P NEW MOD 45 MIN: CPT | Mod: S$GLB,,, | Performed by: INTERNAL MEDICINE

## 2025-06-05 PROCEDURE — 3008F BODY MASS INDEX DOCD: CPT | Mod: CPTII,S$GLB,, | Performed by: INTERNAL MEDICINE

## 2025-06-05 PROCEDURE — 1159F MED LIST DOCD IN RCRD: CPT | Mod: CPTII,S$GLB,, | Performed by: INTERNAL MEDICINE

## 2025-06-05 PROCEDURE — 99999 PR PBB SHADOW E&M-EST. PATIENT-LVL V: CPT | Mod: PBBFAC,,, | Performed by: INTERNAL MEDICINE

## 2025-06-05 PROCEDURE — 83036 HEMOGLOBIN GLYCOSYLATED A1C: CPT

## 2025-06-05 PROCEDURE — 3078F DIAST BP <80 MM HG: CPT | Mod: CPTII,S$GLB,, | Performed by: INTERNAL MEDICINE

## 2025-06-05 PROCEDURE — 1111F DSCHRG MED/CURRENT MED MERGE: CPT | Mod: CPTII,S$GLB,, | Performed by: INTERNAL MEDICINE

## 2025-06-05 PROCEDURE — 87389 HIV-1 AG W/HIV-1&-2 AB AG IA: CPT

## 2025-06-05 PROCEDURE — 1160F RVW MEDS BY RX/DR IN RCRD: CPT | Mod: CPTII,S$GLB,, | Performed by: INTERNAL MEDICINE

## 2025-06-05 PROCEDURE — 4010F ACE/ARB THERAPY RXD/TAKEN: CPT | Mod: CPTII,S$GLB,, | Performed by: INTERNAL MEDICINE

## 2025-06-05 RX ORDER — IBUPROFEN 800 MG/1
800 TABLET, FILM COATED ORAL EVERY 8 HOURS PRN
Qty: 30 TABLET | Refills: 0 | Status: SHIPPED | OUTPATIENT
Start: 2025-06-05 | End: 2025-06-15

## 2025-06-09 ENCOUNTER — RESULTS FOLLOW-UP (OUTPATIENT)
Dept: INTERNAL MEDICINE | Facility: CLINIC | Age: 40
End: 2025-06-09

## 2025-08-21 ENCOUNTER — OFFICE VISIT (OUTPATIENT)
Dept: CARDIOLOGY | Facility: CLINIC | Age: 40
End: 2025-08-21
Payer: COMMERCIAL

## 2025-08-21 VITALS
RESPIRATION RATE: 16 BRPM | BODY MASS INDEX: 31.7 KG/M2 | HEIGHT: 73 IN | HEART RATE: 64 BPM | DIASTOLIC BLOOD PRESSURE: 90 MMHG | WEIGHT: 239.19 LBS | SYSTOLIC BLOOD PRESSURE: 154 MMHG | OXYGEN SATURATION: 98 %

## 2025-08-21 DIAGNOSIS — Z87.891 FORMER SMOKER: ICD-10-CM

## 2025-08-21 DIAGNOSIS — I10 HYPERTENSION, UNSPECIFIED TYPE: Chronic | ICD-10-CM

## 2025-08-21 DIAGNOSIS — E66.09 CLASS 1 OBESITY DUE TO EXCESS CALORIES WITH SERIOUS COMORBIDITY AND BODY MASS INDEX (BMI) OF 30.0 TO 30.9 IN ADULT: ICD-10-CM

## 2025-08-21 DIAGNOSIS — R31.0 GROSS HEMATURIA: ICD-10-CM

## 2025-08-21 DIAGNOSIS — I25.10 CORONARY ARTERY DISEASE, UNSPECIFIED VESSEL OR LESION TYPE, UNSPECIFIED WHETHER ANGINA PRESENT, UNSPECIFIED WHETHER NATIVE OR TRANSPLANTED HEART: Primary | Chronic | ICD-10-CM

## 2025-08-21 DIAGNOSIS — E66.811 CLASS 1 OBESITY DUE TO EXCESS CALORIES WITH SERIOUS COMORBIDITY AND BODY MASS INDEX (BMI) OF 30.0 TO 30.9 IN ADULT: ICD-10-CM

## 2025-08-21 PROCEDURE — 3080F DIAST BP >= 90 MM HG: CPT | Mod: CPTII,S$GLB,,

## 2025-08-21 PROCEDURE — 3077F SYST BP >= 140 MM HG: CPT | Mod: CPTII,S$GLB,,

## 2025-08-21 PROCEDURE — 1159F MED LIST DOCD IN RCRD: CPT | Mod: CPTII,S$GLB,,

## 2025-08-21 PROCEDURE — 3044F HG A1C LEVEL LT 7.0%: CPT | Mod: CPTII,S$GLB,,

## 2025-08-21 PROCEDURE — 99214 OFFICE O/P EST MOD 30 MIN: CPT | Mod: S$GLB,,,

## 2025-08-21 PROCEDURE — 4010F ACE/ARB THERAPY RXD/TAKEN: CPT | Mod: CPTII,S$GLB,,

## 2025-08-21 PROCEDURE — 3008F BODY MASS INDEX DOCD: CPT | Mod: CPTII,S$GLB,,

## 2025-08-21 PROCEDURE — 99999 PR PBB SHADOW E&M-EST. PATIENT-LVL IV: CPT | Mod: PBBFAC,,,

## 2025-08-21 RX ORDER — ASPIRIN 81 MG/1
81 TABLET ORAL DAILY
Qty: 90 TABLET | Refills: 3 | Status: SHIPPED | OUTPATIENT
Start: 2025-08-21 | End: 2026-08-21

## 2025-08-21 RX ORDER — CARVEDILOL 25 MG/1
25 TABLET ORAL 2 TIMES DAILY
Qty: 180 TABLET | Refills: 3 | Status: SHIPPED | OUTPATIENT
Start: 2025-08-21 | End: 2026-08-21

## 2025-08-21 RX ORDER — AMLODIPINE BESYLATE 10 MG/1
10 TABLET ORAL DAILY
Qty: 90 TABLET | Refills: 3 | Status: SHIPPED | OUTPATIENT
Start: 2025-08-21 | End: 2026-08-21

## 2025-08-21 RX ORDER — ATORVASTATIN CALCIUM 20 MG/1
20 TABLET, FILM COATED ORAL NIGHTLY
Qty: 90 TABLET | Refills: 3 | Status: SHIPPED | OUTPATIENT
Start: 2025-08-21 | End: 2026-08-21

## (undated) DEVICE — CATH IMPULSE 6FR MULTI-PAK

## (undated) DEVICE — KIT SITE-RITE NDL GUIDE 21G

## (undated) DEVICE — CATH DIAG IMPULSE 6FR FL4

## (undated) DEVICE — KIT SYR REUSABLE

## (undated) DEVICE — PACK HEART CATH BR

## (undated) DEVICE — CATH IMPULSE PIGTAIL 6F 110CM

## (undated) DEVICE — SHEATH INTRODUCER 6FR 11CM

## (undated) DEVICE — CATH DIAG IMPULSE 6FR FR4

## (undated) DEVICE — CATH INFINITI 4F 3DRC 100CM

## (undated) DEVICE — ANGIOTOUCH KIT

## (undated) DEVICE — KIT MANIFOLD LOW PRESS TUBING

## (undated) DEVICE — OMNIPAQUE 300MG 150ML VIAL

## (undated) DEVICE — PAD DEFIB CADENCE ADULT R2

## (undated) DEVICE — GUIDEWIRE EMERALD .035IN 260CM